# Patient Record
Sex: FEMALE | Race: WHITE | NOT HISPANIC OR LATINO
[De-identification: names, ages, dates, MRNs, and addresses within clinical notes are randomized per-mention and may not be internally consistent; named-entity substitution may affect disease eponyms.]

---

## 2018-09-02 PROBLEM — Z00.00 ENCOUNTER FOR PREVENTIVE HEALTH EXAMINATION: Status: ACTIVE | Noted: 2018-09-02

## 2018-09-10 ENCOUNTER — MEDICATION RENEWAL (OUTPATIENT)
Age: 58
End: 2018-09-10

## 2018-10-02 ENCOUNTER — APPOINTMENT (OUTPATIENT)
Dept: ENDOCRINOLOGY | Facility: CLINIC | Age: 58
End: 2018-10-02
Payer: COMMERCIAL

## 2018-10-02 VITALS — HEART RATE: 88 BPM

## 2018-10-02 VITALS
HEIGHT: 64 IN | WEIGHT: 177 LBS | SYSTOLIC BLOOD PRESSURE: 120 MMHG | HEART RATE: 96 BPM | DIASTOLIC BLOOD PRESSURE: 66 MMHG | BODY MASS INDEX: 30.22 KG/M2

## 2018-10-02 DIAGNOSIS — Z86.19 PERSONAL HISTORY OF OTHER INFECTIOUS AND PARASITIC DISEASES: ICD-10-CM

## 2018-10-02 LAB
GLUCOSE BLDC GLUCOMTR-MCNC: 120
PODIATRY EVAL: 2018

## 2018-10-02 PROCEDURE — 82962 GLUCOSE BLOOD TEST: CPT

## 2018-10-02 PROCEDURE — 99214 OFFICE O/P EST MOD 30 MIN: CPT | Mod: 25

## 2018-10-02 RX ORDER — BLOOD-GLUCOSE METER
W/DEVICE KIT MISCELLANEOUS
Qty: 1 | Refills: 0 | Status: ACTIVE | COMMUNITY

## 2018-10-02 RX ORDER — BLOOD SUGAR DIAGNOSTIC
STRIP MISCELLANEOUS 3 TIMES DAILY
Refills: 0 | Status: ACTIVE | COMMUNITY

## 2018-10-02 RX ORDER — TIOTROPIUM BROMIDE INHALATION SPRAY 3.12 UG/1
2.5 SPRAY, METERED RESPIRATORY (INHALATION) DAILY
Refills: 0 | Status: ACTIVE | COMMUNITY

## 2018-11-15 ENCOUNTER — APPOINTMENT (OUTPATIENT)
Dept: ENDOCRINOLOGY | Facility: CLINIC | Age: 58
End: 2018-11-15

## 2018-11-30 ENCOUNTER — APPOINTMENT (OUTPATIENT)
Dept: ENDOCRINOLOGY | Facility: CLINIC | Age: 58
End: 2018-11-30
Payer: COMMERCIAL

## 2018-11-30 PROCEDURE — 76536 US EXAM OF HEAD AND NECK: CPT

## 2018-12-05 ENCOUNTER — MEDICATION RENEWAL (OUTPATIENT)
Age: 58
End: 2018-12-05

## 2019-01-15 ENCOUNTER — RX RENEWAL (OUTPATIENT)
Age: 59
End: 2019-01-15

## 2019-01-16 ENCOUNTER — RECORD ABSTRACTING (OUTPATIENT)
Age: 59
End: 2019-01-16

## 2019-01-28 ENCOUNTER — APPOINTMENT (OUTPATIENT)
Dept: ENDOCRINOLOGY | Facility: CLINIC | Age: 59
End: 2019-01-28
Payer: COMMERCIAL

## 2019-01-28 VITALS
HEIGHT: 64 IN | DIASTOLIC BLOOD PRESSURE: 80 MMHG | HEART RATE: 90 BPM | SYSTOLIC BLOOD PRESSURE: 120 MMHG | BODY MASS INDEX: 30.39 KG/M2 | WEIGHT: 178 LBS

## 2019-01-28 DIAGNOSIS — S93.401A SPRAIN OF UNSPECIFIED LIGAMENT OF RIGHT ANKLE, INITIAL ENCOUNTER: ICD-10-CM

## 2019-01-28 LAB — GLUCOSE BLDC GLUCOMTR-MCNC: 104

## 2019-01-28 PROCEDURE — 82962 GLUCOSE BLOOD TEST: CPT

## 2019-01-28 PROCEDURE — 99214 OFFICE O/P EST MOD 30 MIN: CPT | Mod: 25

## 2019-04-02 ENCOUNTER — APPOINTMENT (OUTPATIENT)
Dept: ENDOCRINOLOGY | Facility: CLINIC | Age: 59
End: 2019-04-02
Payer: COMMERCIAL

## 2019-04-02 VITALS
HEART RATE: 94 BPM | BODY MASS INDEX: 30.05 KG/M2 | DIASTOLIC BLOOD PRESSURE: 80 MMHG | OXYGEN SATURATION: 90 % | HEIGHT: 64 IN | WEIGHT: 176 LBS | SYSTOLIC BLOOD PRESSURE: 128 MMHG

## 2019-04-02 LAB
GLUCOSE BLDC GLUCOMTR-MCNC: 118
GLUCOSE SERPL-MCNC: 123
HBA1C MFR BLD HPLC: 6.4
HDLC SERPL-MCNC: 54
LDLC SERPL DIRECT ASSAY-MCNC: 96

## 2019-04-02 PROCEDURE — 82962 GLUCOSE BLOOD TEST: CPT

## 2019-04-02 PROCEDURE — 99214 OFFICE O/P EST MOD 30 MIN: CPT | Mod: 25

## 2019-09-11 ENCOUNTER — APPOINTMENT (OUTPATIENT)
Dept: ENDOCRINOLOGY | Facility: CLINIC | Age: 59
End: 2019-09-11
Payer: COMMERCIAL

## 2019-09-11 VITALS
WEIGHT: 167 LBS | OXYGEN SATURATION: 93 % | HEART RATE: 85 BPM | DIASTOLIC BLOOD PRESSURE: 64 MMHG | SYSTOLIC BLOOD PRESSURE: 100 MMHG | HEIGHT: 64 IN | BODY MASS INDEX: 28.51 KG/M2

## 2019-09-11 DIAGNOSIS — G47.30 SLEEP APNEA, UNSPECIFIED: ICD-10-CM

## 2019-09-11 LAB — GLUCOSE BLDC GLUCOMTR-MCNC: 113

## 2019-09-11 PROCEDURE — 99214 OFFICE O/P EST MOD 30 MIN: CPT | Mod: 25

## 2019-09-11 PROCEDURE — 82962 GLUCOSE BLOOD TEST: CPT

## 2019-09-11 RX ORDER — ALENDRONATE SODIUM 70 MG/1
70 TABLET ORAL
Refills: 0 | Status: DISCONTINUED | COMMUNITY
End: 2019-09-11

## 2019-09-11 NOTE — HISTORY OF PRESENT ILLNESS
[FreeTextEntry1] : recent stress after breast cancer sugery - problem with lymphatci system in breast\par severe bone pain after recent prolia shot from Dr Brown - put on steroid and pain meds for 1 week, now off meds\par \par 1. T2DM\par Current DM meds: \par MFN  mg 2 tabs daily - tolerating this\par SMBG: testing 1-2x daily. no meter, -160s\par Diet: not watching diet\par Exercise: will exercise more\par Complications:\par eye exam 2/2018 - no DR\par (-) neuropathy\par Stress test 2018 okay per pt\par (-) nephropathy neg urine microalb/Cr 1/2019\par \par 2.3.Hypothyroid, thyroid nodules\par Current thyroid medication: Levothyroxine 125 mcg daily\par Current symptoms:\par Anterior neck pain: denies\par Dysphagia: denies\par Voice changes: denies\par \par 4. Hyperlipidemia - on Pravastatin 20 mg nightly, stopped taking it b/c she thought it was causing dry skin\par

## 2019-09-11 NOTE — PHYSICAL EXAM
[No Acute Distress] : no acute distress [Alert] : alert [Well Nourished] : well nourished [Well Developed] : well developed [Normal Sclera/Conjunctiva] : normal sclera/conjunctiva [No LAD] : no lymphadenopathy [EOMI] : extra ocular movement intact [No Respiratory Distress] : no respiratory distress [Normal Rate and Effort] : normal respiratory rhythm and effort [Normal Rate] : heart rate was normal  [Normal S1, S2] : normal S1 and S2 [No Edema] : there was no peripheral edema [Normal Bowel Sounds] : normal bowel sounds [Not Tender] : non-tender [Soft] : abdomen soft [Not Distended] : not distended [Cranial Nerves Intact] : cranial nerves 2-12 were intact [Normal Reflexes] : deep tendon reflexes were 2+ and symmetric [No Tremors] : no tremors [Oriented x3] : oriented to person, place, and time [Normal Insight/Judgement] : insight and judgment were intact [Normal Affect] : the affect was normal [Normal Mood] : the mood was normal [Foot Ulcers] : no foot ulcers [Right Foot Was Examined] : right foot ~C was examined [Left Foot Was Examined] : left foot ~C was examined [Normal] : normal [2+] : 2+ in the dorsalis pedis [Vibration Dec.] : normal vibratory sensation at the level of the toes [Position Sense Dec.] : normal position sense at the level of the toes [Diminished Throughout Both Feet] : normal tactile sensation with monofilament testing throughout both feet [de-identified] : Left thyroid nodules

## 2019-09-11 NOTE — REASON FOR VISIT
[Follow-Up: _____] : a [unfilled] follow-up visit [FreeTextEntry1] : T2DM, hypothyroid, thyroid nodule, hyperlipidemia

## 2019-09-11 NOTE — ASSESSMENT
[FreeTextEntry1] : 1. T2DM - A1c worse due to diet\par - increase  mg 3 tabs daily\par - restart diet\par - repeat A1c 3 month\par - eye exam with ophthalmology\par \par 2. stable left thyroid nodule - RTO thyroid sono 11/2019\par \par 3. hypothyroid - clinically and biochemically euthyroid\par - cont LT4 125 mcg daily\par \par 4. hyperlipidemia - LDL slightly worse, adhere with statin

## 2019-09-11 NOTE — REVIEW OF SYSTEMS
[Shortness Of Breath] : shortness of breath [SOB on Exertion] : shortness of breath during exertion [Nocturia] : nocturia [Dry Skin] : dry skin [Fatigue] : no fatigue [Recent Weight Gain (___ Lbs)] : no recent weight gain [Recent Weight Loss (___ Lbs)] : no recent weight loss [Visual Field Defect] : no visual field defect [Blurry Vision] : no blurred vision [Dysphagia] : no dysphagia [Dysphonia] : no dysphonia [Neck Pain] : no neck pain [Chest Pain] : no chest pain [Palpitations] : no palpitations [Nausea] : no nausea [Vomiting] : no vomiting was observed [Constipation] : no constipation [Diarrhea] : no diarrhea [Abdominal Pain] : no abdominal pain [Muscle Cramps] : no muscle cramps [Polyuria] : no polyuria [Myalgia] : no myalgia  [Dizziness] : no dizziness [Pain/Numbness of Digits] : no pain/numbness of digits [Depression] : no depression [Anxiety] : no anxiety [Polydipsia] : no polydipsia [Cold Intolerance] : cold tolerant [FreeTextEntry6] : due COPD, now on oxygen [Heat Intolerance] : heat tolerant [FreeTextEntry8] : 2x nightly, stable

## 2019-09-11 NOTE — DATA REVIEWED
[FreeTextEntry1] : Thyroid sonogram in office 11/30/17 - Left MP nodule 2x2x2 mm\par \par Thyroid sonogram 11/30/18\par Findings: \par Right thyroid lobe  4.6x1.8x2.1   cm heterogeneous gland\par Left Thyroid lobe  4.5x2.3x1.9   cm\par Left mid pole hypoechoic nodule 3x2 mm- not vascular\par Left Lower pole coarse calcification 2 mm\par heterogeneous gland\par Impression: stable Left MP nodule, small coarse calcification. repeat thyroid sonogram 1 year\par \par Labs 1/9/19\par Cr 0.67, GFR 98\par LDL chol 77, Tg 159\par A1c 6.8%\par TSh 1.54, Ft4 1.76\par urine microalb/Cr 4\par \par Labs 4/1/19\par Cr 0.70, GFR 96\par Gluc 123, A1c 6.4%\par LDL 96, HDL 54, Tg 177\par FT4 1.34, TSH 3.420\par \par labs 8/17/19\par Gluc 130, A1c 7%\par Cr 0.58, \par LDl 88, HDL 55, Tg 142\par TSH 2.720, FT4 1.35

## 2019-11-21 ENCOUNTER — APPOINTMENT (OUTPATIENT)
Dept: ENDOCRINOLOGY | Facility: CLINIC | Age: 59
End: 2019-11-21
Payer: COMMERCIAL

## 2019-11-21 PROCEDURE — 76536 US EXAM OF HEAD AND NECK: CPT

## 2020-02-18 LAB
HBA1C MFR BLD HPLC: 6.8
LDLC SERPL DIRECT ASSAY-MCNC: 100

## 2020-02-19 ENCOUNTER — APPOINTMENT (OUTPATIENT)
Dept: ENDOCRINOLOGY | Facility: CLINIC | Age: 60
End: 2020-02-19
Payer: COMMERCIAL

## 2020-02-19 VITALS
HEART RATE: 96 BPM | SYSTOLIC BLOOD PRESSURE: 120 MMHG | HEIGHT: 64 IN | OXYGEN SATURATION: 81 % | WEIGHT: 164 LBS | BODY MASS INDEX: 28 KG/M2 | DIASTOLIC BLOOD PRESSURE: 70 MMHG

## 2020-02-19 LAB — GLUCOSE BLDC GLUCOMTR-MCNC: 140

## 2020-02-19 PROCEDURE — 99214 OFFICE O/P EST MOD 30 MIN: CPT | Mod: 25

## 2020-02-19 PROCEDURE — 82962 GLUCOSE BLOOD TEST: CPT

## 2020-02-19 RX ORDER — DENOSUMAB 60 MG/ML
60 INJECTION SUBCUTANEOUS
Refills: 0 | Status: DISCONTINUED | COMMUNITY
End: 2020-02-19

## 2020-02-19 NOTE — REVIEW OF SYSTEMS
[Shortness Of Breath] : shortness of breath [SOB on Exertion] : shortness of breath during exertion [Nocturia] : nocturia [Dry Skin] : dry skin [Fatigue] : no fatigue [Recent Weight Gain (___ Lbs)] : no recent weight gain [Recent Weight Loss (___ Lbs)] : no recent weight loss [Visual Field Defect] : no visual field defect [Dysphonia] : no dysphonia [Blurry Vision] : no blurred vision [Dysphagia] : no dysphagia [Neck Pain] : no neck pain [Chest Pain] : no chest pain [Palpitations] : no palpitations [Nausea] : no nausea [Vomiting] : no vomiting was observed [Abdominal Pain] : no abdominal pain [Constipation] : no constipation [Diarrhea] : no diarrhea [Muscle Cramps] : no muscle cramps [Polyuria] : no polyuria [Dizziness] : no dizziness [Myalgia] : no myalgia  [Depression] : no depression [Pain/Numbness of Digits] : no pain/numbness of digits [Anxiety] : no anxiety [Polydipsia] : no polydipsia [Heat Intolerance] : heat tolerant [Cold Intolerance] : cold tolerant [FreeTextEntry8] : 2x nightly, stable [FreeTextEntry6] : due COPD, now on oxygen [FreeTextEntry9] : Right breast pain and swelling since RT - going fot therapy

## 2020-02-19 NOTE — HISTORY OF PRESENT ILLNESS
[FreeTextEntry1] : breast cancer s/p RT -but has damage to lungs\par \par 1. T2DM\par Current DM meds: \par MFN  mg 3 tabs daily - tolerating this. She did not tolerate Jardiance. \par SMBG: testing 1-2x daily. no meter, 's\par Diet: decreased appetite lately\par Exercise: will exercise more\par Complications:\par eye exam 2/2018 - no DR\par (-) neuropathy\par Stress test 2018 okay per pt\par (-) nephropathy neg urine microalb/Cr 1/2019\par \par 2.3.Hypothyroid, thyroid nodules\par Current thyroid medication: Levothyroxine 125 mcg daily\par Current symptoms:\par Anterior neck pain: denies\par Dysphagia: denies\par Voice changes: denies\par \par 4. Hyperlipidemia - on Pravastatin 20 mg nightly, stopped taking it b/c she thought it was causing dry skin\par

## 2020-02-19 NOTE — PHYSICAL EXAM
[Alert] : alert [Well Nourished] : well nourished [Well Developed] : well developed [No Acute Distress] : no acute distress [EOMI] : extra ocular movement intact [Normal Sclera/Conjunctiva] : normal sclera/conjunctiva [No Respiratory Distress] : no respiratory distress [Normal Rate and Effort] : normal respiratory rhythm and effort [No LAD] : no lymphadenopathy [Normal S1, S2] : normal S1 and S2 [No Edema] : there was no peripheral edema [Normal Rate] : heart rate was normal  [Normal Bowel Sounds] : normal bowel sounds [Not Tender] : non-tender [Soft] : abdomen soft [Not Distended] : not distended [No Tremors] : no tremors [Cranial Nerves Intact] : cranial nerves 2-12 were intact [Normal Reflexes] : deep tendon reflexes were 2+ and symmetric [Normal Insight/Judgement] : insight and judgment were intact [Oriented x3] : oriented to person, place, and time [Normal Mood] : the mood was normal [Normal Affect] : the affect was normal [Foot Ulcers] : no foot ulcers [Acanthosis Nigricans] : no acanthosis nigricans [de-identified] : nodular thyroid texture

## 2020-02-19 NOTE — DATA REVIEWED
[FreeTextEntry1] : Thyroid sonogram in office 11/30/17 - Left MP nodule 2x2x2 mm\par \par Thyroid sonogram 11/30/18\par Findings: \par Right thyroid lobe  4.6x1.8x2.1   cm heterogeneous gland\par Left Thyroid lobe  4.5x2.3x1.9   cm\par Left mid pole hypoechoic nodule 3x2 mm- not vascular\par Left Lower pole coarse calcification 2 mm\par heterogeneous gland\par Impression: stable Left MP nodule, small coarse calcification. repeat thyroid sonogram 1 year\par \par Thyroid Ultrasound Report 11/21/19 \par Comparison: Report dated 11/30/18. \par Findings: \par Right thyroid lobe  4.3x1.4x1.9   cm - homogeneous thyroid\par no nodules\par Left Thyroid lobe   4.8x1.7x2.0  cm - homogeneous thyroid\par Left mid pole nodule 2x2x2 mm - hypoechoic, stable\par Left lower pole coarse calcification 2 mm - stable\par Isthmus .6  cm\par Impression\par stable Left nodules and calcification\par repeat thyroid sonogram in 1 year \par Labs 1/9/19\par Cr 0.67, GFR 98\par LDL chol 77, Tg 159\par A1c 6.8%\par TSh 1.54, Ft4 1.76\par urine microalb/Cr 4\par \par Labs 4/1/19\par Cr 0.70, GFR 96\par Gluc 123, A1c 6.4%\par LDL 96, HDL 54, Tg 177\par FT4 1.34, TSH 3.420\par \par labs 8/17/19\par Gluc 130, A1c 7%\par Cr 0.58, \par LDl 88, HDL 55, Tg 142\par TSH 2.720, FT4 1.35\par \par Labs 1/13/20\par Gluc 131, A1c 6.8\par Cr 0.60, \par , HDL 64, Tg 181\par TSH 3.210, FT4 1.40

## 2020-02-19 NOTE — REASON FOR VISIT
[Follow-Up: _____] : a [unfilled] follow-up visit [FreeTextEntry1] : stable T2DM, hypothyroid, thyroid nodule, hyperlipidemia

## 2020-02-19 NOTE — ASSESSMENT
[FreeTextEntry1] : 1. T2DM - A1c imprpved\par - decrease  mg 2 tabs daily, risks of MFN discussed bryon in setting of severe COPD. she is hesitant to start new meds and did not tolerate Jardiance in past.  reassess in 3 months\par - repeat A1c 3 month\par - eye exam with ophthalmology\par \par 2. stable left thyroid nodule - RTO thyroid sono 11/2020\par \par 3. hypothyroid - clinically and biochemically euthyroid\par - cont LT4 125 mcg daily\par \par 4. hyperlipidemia - LDL slightly worse, cont statin, dose increased by PCP

## 2020-04-09 ENCOUNTER — RX RENEWAL (OUTPATIENT)
Age: 60
End: 2020-04-09

## 2020-06-22 LAB
HBA1C MFR BLD HPLC: 6.8
LDLC SERPL DIRECT ASSAY-MCNC: 82

## 2020-06-23 ENCOUNTER — APPOINTMENT (OUTPATIENT)
Dept: ENDOCRINOLOGY | Facility: CLINIC | Age: 60
End: 2020-06-23
Payer: COMMERCIAL

## 2020-06-23 VITALS
WEIGHT: 157 LBS | HEIGHT: 64 IN | DIASTOLIC BLOOD PRESSURE: 74 MMHG | SYSTOLIC BLOOD PRESSURE: 120 MMHG | BODY MASS INDEX: 26.8 KG/M2 | HEART RATE: 89 BPM

## 2020-06-23 LAB — GLUCOSE BLDC GLUCOMTR-MCNC: 117

## 2020-06-23 PROCEDURE — 99214 OFFICE O/P EST MOD 30 MIN: CPT | Mod: 25

## 2020-06-23 PROCEDURE — 82962 GLUCOSE BLOOD TEST: CPT

## 2020-06-23 NOTE — REASON FOR VISIT
[Follow - Up] : a follow-up visit [Thyroid nodule/ MNG] : thyroid nodule/ MNG [Hypothyroidism] : hypothyroidism [DM Type 2] : DM Type 2 [Other___] : [unfilled]

## 2020-06-23 NOTE — HISTORY OF PRESENT ILLNESS
[FreeTextEntry1] : Interval Hx: on Prednisone taper for 14 days for COPD.\par \par 1. T2DM - worse w steroids\par Current DM meds: \par MFN  mg 2 tabs daily - tolerating this. She did not tolerate Jardiance. \par SMBG: testing 1-2x daily. no meter, FS post meals 220s, fasting 140-150's\par Diet: eating 3 meals\par Exercise: none\par Complications:\par eye exam 10/2019 - no DR\par (-) neuropathy\par Stress test 2018 okay per pt\par (-) nephropathy neg urine microalb/Cr 6/2020\par ==============================================\par 2.3.Hypothyroid, thyroid nodules\par Current thyroid medication: Levothyroxine 125 mcg daily\par Current symptoms:\par Anterior neck pain: denies\par Dysphagia: denies\par Voice changes: denies\par ==============================================\par 4. Hyperlipidemia - on Pravastatin 40 mg nightly\par

## 2020-06-23 NOTE — PHYSICAL EXAM
[Alert] : alert [Well Nourished] : well nourished [Healthy Appearance] : healthy appearance [No Acute Distress] : no acute distress [Normal Sclera/Conjunctiva] : normal sclera/conjunctiva [EOMI] : extra ocular movement intact [No LAD] : no lymphadenopathy [No Thyroid Nodules] : no palpable thyroid nodules [No Respiratory Distress] : no respiratory distress [No Accessory Muscle Use] : no accessory muscle use [Clear to Auscultation] : lungs were clear to auscultation bilaterally [Normal S1, S2] : normal S1 and S2 [Normal Rate] : heart rate was normal [No Edema] : no peripheral edema [Normal Bowel Sounds] : normal bowel sounds [Not Tender] : non-tender [Soft] : abdomen soft [Normal Gait] : normal gait [No Rash] : no rash [Cranial Nerves Intact] : cranial nerves 2-12 were intact [Oriented x3] : oriented to person, place, and time [Normal Affect] : the affect was normal [Normal Insight/Judgement] : insight and judgment were intact [Normal Mood] : the mood was normal

## 2020-06-23 NOTE — ASSESSMENT
[FreeTextEntry1] : 1. T2DM - A1c okay, hyperglycemia by history\par - cont  mg 2 tabs daily, risks of MFN discussed bryon in setting of severe COPD, should not take higher doses\par - start Tradjenta 5 mg daily, risks/benefits discussed\par - repeat A1c 3 month\par - eye exam with ophthalmology\par \par 2. stable left thyroid nodule - RTO thyroid sono 11/2020\par \par 3. hypothyroid - clinically and biochemically euthyroid\par - cont LT4 125 mcg daily\par \par 4. hyperlipidemia - LDL okay,, cont statin

## 2020-06-23 NOTE — DATA REVIEWED
[FreeTextEntry1] : Thyroid sonogram in office 11/30/17 - Left MP nodule 2x2x2 mm\par \par Thyroid sonogram 11/30/18\par Findings: \par Right thyroid lobe  4.6x1.8x2.1   cm heterogeneous gland\par Left Thyroid lobe  4.5x2.3x1.9   cm\par Left mid pole hypoechoic nodule 3x2 mm- not vascular\par Left Lower pole coarse calcification 2 mm\par heterogeneous gland\par Impression: stable Left MP nodule, small coarse calcification. repeat thyroid sonogram 1 year\par \par Thyroid Ultrasound Report 11/21/19 \par Comparison: Report dated 11/30/18. \par Findings: \par Right thyroid lobe  4.3x1.4x1.9   cm - homogeneous thyroid\par no nodules\par Left Thyroid lobe   4.8x1.7x2.0  cm - homogeneous thyroid\par Left mid pole nodule 2x2x2 mm - hypoechoic, stable\par Left lower pole coarse calcification 2 mm - stable\par Isthmus .6  cm\par Impression\par stable Left nodules and calcification\par repeat thyroid sonogram in 1 year \par ==================================================\par Labs 1/9/19\par Cr 0.67, GFR 98\par LDL chol 77, Tg 159\par A1c 6.8%\par TSh 1.54, Ft4 1.76\par urine microalb/Cr 4\par \par Labs 4/1/19\par Cr 0.70, GFR 96\par Gluc 123, A1c 6.4%\par LDL 96, HDL 54, Tg 177\par FT4 1.34, TSH 3.420\par \par labs 8/17/19\par Gluc 130, A1c 7%\par Cr 0.58, \par LDl 88, HDL 55, Tg 142\par TSH 2.720, FT4 1.35\par \par Labs 1/13/20\par Gluc 131, A1c 6.8\par Cr 0.60, \par , HDL 64, Tg 181\par TSH 3.210, FT4 1.40\par \par Labs 6/20/20\par Gluc 123, A1c 6.8%\par Cr 0.68, GFR 96\par LDL 82, HDL 71, Tg 152\par TSH 3.37, Ft4 1.66\par urine alb/Cr 8

## 2020-06-23 NOTE — REVIEW OF SYSTEMS
[Fatigue] : no fatigue [Recent Weight Loss (___ Lbs)] : recent weight loss: [unfilled] lbs [Blurred Vision] : no blurred vision [As Noted in HPI] : as noted in HPI [Chest Pain] : no chest pain [Palpitations] : no palpitations [Shortness Of Breath] : shortness of breath [SOB on Exertion] : shortness of breath on exertion [Nausea] : no nausea [Abdominal Pain] : no abdominal pain [Vomiting] : no vomiting [Nocturia] : no nocturia [Polyuria] : no polyuria [Pain/Numbness of Digits] : no pain/numbness of digits [Tremors] : no tremors [Depression] : no depression [Anxiety] : no anxiety [Polydipsia] : no polydipsia [FreeTextEntry6] : on oxygen

## 2020-09-17 ENCOUNTER — RX RENEWAL (OUTPATIENT)
Age: 60
End: 2020-09-17

## 2020-10-08 LAB
HBA1C MFR BLD HPLC: 6.9
LDLC SERPL DIRECT ASSAY-MCNC: 82

## 2020-10-09 ENCOUNTER — APPOINTMENT (OUTPATIENT)
Dept: ENDOCRINOLOGY | Facility: CLINIC | Age: 60
End: 2020-10-09
Payer: COMMERCIAL

## 2020-10-09 VITALS
BODY MASS INDEX: 26.46 KG/M2 | WEIGHT: 155 LBS | OXYGEN SATURATION: 98 % | DIASTOLIC BLOOD PRESSURE: 68 MMHG | HEART RATE: 70 BPM | HEIGHT: 64 IN | SYSTOLIC BLOOD PRESSURE: 124 MMHG

## 2020-10-09 LAB — GLUCOSE BLDC GLUCOMTR-MCNC: 126

## 2020-10-09 PROCEDURE — 82962 GLUCOSE BLOOD TEST: CPT

## 2020-10-09 PROCEDURE — 99215 OFFICE O/P EST HI 40 MIN: CPT | Mod: 25

## 2020-10-09 NOTE — REASON FOR VISIT
[Follow - Up] : a follow-up visit [DM Type 2] : DM Type 2 [Adrenal Evaluation/Adrenal Disorder] : adrenal evaluation/adrenal disorder [Hypothyroidism] : hypothyroidism [Osteoporosis] : osteoporosis [Thyroid nodule/ MNG] : thyroid nodule/ MNG [Other___] : [unfilled]

## 2020-10-09 NOTE — ASSESSMENT
[FreeTextEntry1] : 1. T2DM - A1c stable\par - continue  mg 2 tabs daily, risks of MFN discussed bryon in setting of severe COPD, should not take higher doses\par - pt. declined starting Tradjenta\par - repeat A1c 3 month\par - due for eye exam with ophthalmology\par \par 2. Nodular thickening of left adrenal gland: r/o Cushing Syndrome, check hormones, plasma renin activity, aldosterone, metanephrine, and Cortisol suppression with Dexamethasone \par - will obtain MRI results \par \par 3. stable left thyroid nodule - repeat thyroid sonogram now \par \par 4. hypothyroid - clinically and biochemically euthyroid\par - continue LT4 125 mcg daily\par \par 5. hyperlipidemia - controlled, continue statin. \par \par 6. Osteoporosis: restart Fosamax 70 mg 1 tab weekly, repeat DEXA in 1 year\par - adverse reaction to Prolia \par \par Follow up with PMD r/t wheeze in left upper lung \par \par RTO in 3 months

## 2020-10-09 NOTE — REVIEW OF SYSTEMS
[Recent Weight Gain (___ Lbs)] : recent weight gain: [unfilled] lbs [Cold Intolerance] : cold intolerance [Swelling] : swelling [Fatigue] : no fatigue [Decreased Appetite] : appetite not decreased [Visual Field Defect] : no visual field defect [Blurred Vision] : no blurred vision [Dysphagia] : no dysphagia [Neck Pain] : no neck pain [Dysphonia] : no dysphonia [Chest Pain] : no chest pain [Palpitations] : no palpitations [Constipation] : no constipation [Diarrhea] : no diarrhea [Polyuria] : no polyuria [Dysuria] : no dysuria [Joint Pain] : no joint pain [Muscle Weakness] : no muscle weakness [Dry Skin] : no dry skin [Hirsutism] : no hirsutism [Hair Loss] : no hair loss [Headaches] : no headaches [Tremors] : no tremors [Depression] : no depression [Anxiety] : no anxiety [Polydipsia] : no polydipsia [Heat Intolerance] : no heat intolerance [Easy Bruising] : no tendency for easy bruising [de-identified] : right ankle hx of fracture

## 2020-10-09 NOTE — PHYSICAL EXAM
[Alert] : alert [Well Nourished] : well nourished [No Acute Distress] : no acute distress [Well Developed] : well developed [Normal Sclera/Conjunctiva] : normal sclera/conjunctiva [EOMI] : extra ocular movement intact [No LAD] : no lymphadenopathy [Thyroid Not Enlarged] : the thyroid was not enlarged [No Thyroid Nodules] : no palpable thyroid nodules [No Respiratory Distress] : no respiratory distress [Normal Rate and Effort] : normal respiratory rate and effort [Normal S1, S2] : normal S1 and S2 [Normal Rate] : heart rate was normal [Regular Rhythm] : with a regular rhythm [No Edema] : no peripheral edema [Pedal Pulses Normal] : the pedal pulses are present [Normal Bowel Sounds] : normal bowel sounds [Not Tender] : non-tender [Soft] : abdomen soft [No Stigmata of Cushings Syndrome] : no stigmata of Cushings Syndrome [Normal Gait] : normal gait [No Rash] : no rash [Right Foot Was Examined] : right foot ~C was examined [Left Foot Was Examined] : left foot ~C was examined [Normal] : normal [No Tremors] : no tremors [Oriented x3] : oriented to person, place, and time [Normal Insight/Judgement] : insight and judgment were intact [Normal Mood] : the mood was normal [Acanthosis Nigricans] : no acanthosis nigricans [Foot Ulcers] : no foot ulcers [Diminished Throughout Both Feet] : normal tactile sensation with monofilament testing throughout both feet [de-identified] : wheeze in left upper lung lobe

## 2020-10-09 NOTE — HISTORY OF PRESENT ILLNESS
[FreeTextEntry1] : Pt. reports recently had MI (unsure of date of MI) and she was started on Aspirin\par \par Type 2 DM - worse w steroids\par Current DM meds: \par MFN  mg 2 tabs daily - tolerating this. She did not tolerate Jardiance. \par \par SMBG: testing 1-2x daily. no meter, fasting 124-134\par Current \par \par Diet: eating 3 meals\par Exercise: none\par Weight: loss 34 pound within 1 year \par \par Complications:\par eye exam 10/2019 - no DR, next appointment next week \par (-) neuropathy\par Stress test 2018 okay per pt\par (-) nephropathy neg urine microalb/Cr 6/2020\par \par Hypothyroid, thyroid nodules\par Current thyroid medication: Levothyroxine 125 mcg daily\par Current symptoms:\par Anterior neck pain: denies\par Dysphagia: denies\par Voice changes: denies\par \par New Nodular thickening of left adrenal gland: found incidentally on CT scan\par denies high BP, high blood sugars, weight gain, weakness in muscle or bones, no skin changes. No headaches, sweating or fast HR\par Pt. had MRI done last week.

## 2020-10-10 ENCOUNTER — APPOINTMENT (OUTPATIENT)
Dept: ENDOCRINOLOGY | Facility: CLINIC | Age: 60
End: 2020-10-10

## 2020-12-17 ENCOUNTER — RX RENEWAL (OUTPATIENT)
Age: 60
End: 2020-12-17

## 2020-12-31 ENCOUNTER — APPOINTMENT (OUTPATIENT)
Dept: ENDOCRINOLOGY | Facility: CLINIC | Age: 60
End: 2020-12-31
Payer: COMMERCIAL

## 2020-12-31 PROCEDURE — 76536 US EXAM OF HEAD AND NECK: CPT

## 2020-12-31 PROCEDURE — 99072 ADDL SUPL MATRL&STAF TM PHE: CPT

## 2021-01-19 LAB
HBA1C MFR BLD HPLC: 6.8
LDLC SERPL DIRECT ASSAY-MCNC: 89
MICROALBUMIN/CREAT 24H UR-RTO: 71.98

## 2021-01-20 ENCOUNTER — APPOINTMENT (OUTPATIENT)
Dept: ENDOCRINOLOGY | Facility: CLINIC | Age: 61
End: 2021-01-20
Payer: COMMERCIAL

## 2021-01-20 VITALS
BODY MASS INDEX: 26.98 KG/M2 | DIASTOLIC BLOOD PRESSURE: 70 MMHG | OXYGEN SATURATION: 94 % | WEIGHT: 158 LBS | HEIGHT: 64 IN | HEART RATE: 98 BPM | SYSTOLIC BLOOD PRESSURE: 128 MMHG

## 2021-01-20 LAB — GLUCOSE BLDC GLUCOMTR-MCNC: 124

## 2021-01-20 PROCEDURE — 99214 OFFICE O/P EST MOD 30 MIN: CPT | Mod: 25

## 2021-01-20 PROCEDURE — 99072 ADDL SUPL MATRL&STAF TM PHE: CPT

## 2021-01-20 PROCEDURE — 82962 GLUCOSE BLOOD TEST: CPT

## 2021-01-20 RX ORDER — ANASTROZOLE TABLETS 1 MG/1
1 TABLET ORAL DAILY
Refills: 0 | Status: DISCONTINUED | COMMUNITY
End: 2021-01-20

## 2021-01-20 RX ORDER — DEXAMETHASONE 1 MG/1
1 TABLET ORAL
Qty: 1 | Refills: 0 | Status: DISCONTINUED | COMMUNITY
Start: 2020-10-09 | End: 2021-01-20

## 2021-01-20 RX ORDER — PREDNISONE 50 MG/1
TABLET ORAL
Refills: 0 | Status: DISCONTINUED | COMMUNITY
End: 2021-01-20

## 2021-01-20 RX ORDER — FLUTICASONE PROPIONATE 50 UG/1
50 SPRAY, METERED NASAL
Refills: 0 | Status: DISCONTINUED | COMMUNITY
Start: 2020-02-19 | End: 2021-01-20

## 2021-01-20 NOTE — REASON FOR VISIT
[Follow - Up] : a follow-up visit [DM Type 2] : DM Type 2 [Hypothyroidism] : hypothyroidism [Thyroid nodule/ MNG] : thyroid nodule/ MNG [Other___] : [unfilled]

## 2021-01-20 NOTE — DATA REVIEWED
[FreeTextEntry1] : Thyroid sonogram in office 11/30/17 - Left MP nodule 2x2x2 mm\par \par Thyroid sonogram 11/30/18\par Findings: \par Right thyroid lobe  4.6x1.8x2.1   cm heterogeneous gland\par Left Thyroid lobe  4.5x2.3x1.9   cm\par Left mid pole hypoechoic nodule 3x2 mm- not vascular\par Left Lower pole coarse calcification 2 mm\par heterogeneous gland\par Impression: stable Left MP nodule, small coarse calcification. repeat thyroid sonogram 1 year\par \par Thyroid Ultrasound Report 11/21/19 \par Comparison: Report dated 11/30/18. \par Findings: \par Right thyroid lobe  4.3x1.4x1.9   cm - homogeneous thyroid\par no nodules\par Left Thyroid lobe   4.8x1.7x2.0  cm - homogeneous thyroid\par Left mid pole nodule 2x2x2 mm - hypoechoic, stable\par Left lower pole coarse calcification 2 mm - stable\par Isthmus .6  cm\par Impression\par stable Left nodules and calcification\par repeat thyroid sonogram in 1 year \par \par Thyroid Ultrasound Report 12/31/20 \par Comparison: Report dated 11/21/19. \par Findings: \par Right thyroid lobe  4.6x1.7x1.9   cm - homogeneous gland\par no Right thyroid nodules\par Left Thyroid lobe  5.0x1.9x2.0   cm - homogeneous gland\par Left mid pole cyst 2x2 mm - stable\par Left lower pole 3 mm calcification\par Isthmus  0.4 cm\par Impression\par stable left thyrod cyst and small calfication\par repeat sono 1-2 years \par \par Ladyjudie Weeks, DO\par ==================================================\par CT CAP 9/4/20 - nodular thickening left adrenal gland\par MRI abd 9/30/20 normal adrenal glands, no nodules\par Labs 1/9/19\par Cr 0.67, GFR 98\par LDL chol 77, Tg 159\par A1c 6.8%\par TSh 1.54, Ft4 1.76\par urine microalb/Cr 4\par \par Labs 4/1/19\par Cr 0.70, GFR 96\par Gluc 123, A1c 6.4%\par LDL 96, HDL 54, Tg 177\par FT4 1.34, TSH 3.420\par \par labs 8/17/19\par Gluc 130, A1c 7%\par Cr 0.58, \par LDl 88, HDL 55, Tg 142\par TSH 2.720, FT4 1.35\par \par Labs 1/13/20\par Gluc 131, A1c 6.8\par Cr 0.60, \par , HDL 64, Tg 181\par TSH 3.210, FT4 1.40\par \par Labs 6/20/20\par Gluc 123, A1c 6.8%\par Cr 0.68, GFR 96\par LDL 82, HDL 71, Tg 152\par TSH 3.37, Ft4 1.66\par urine alb/Cr 8\par \par labs 12/30/20 \par Gluc 138, A1c 6.8\par Cr 0.71, GFR 93\par LDL 89, HDL 68, Tg 144\par TSH 1.50, Ft4 1.56\par CBC ok\par B12 554\par vit D 33\par urine alb/Cr 6

## 2021-01-20 NOTE — REVIEW OF SYSTEMS
[Recent Weight Loss (___ Lbs)] : recent weight loss: [unfilled] lbs [As Noted in HPI] : as noted in HPI [Shortness Of Breath] : shortness of breath [SOB on Exertion] : shortness of breath on exertion [Fatigue] : no fatigue [Blurred Vision] : no blurred vision [Chest Pain] : no chest pain [Abdominal Pain] : no abdominal pain [Diarrhea] : no diarrhea [Polyuria] : no polyuria [Nocturia] : no nocturia [Pain/Numbness of Digits] : no pain/numbness of digits [Polydipsia] : no polydipsia [FreeTextEntry6] : on oxygen, has COPD

## 2021-01-20 NOTE — ASSESSMENT
[FreeTextEntry1] : 1. T2DM - A1c okay\par - cont  mg 2 tabs daily, risks of MFN discussed bryon in setting of severe COPD, should not take higher doses\par - repeat A1c 3 month\par - eye exam with ophthalmology\par \par 2. stable left thyroid nodule - RTO thyroid sono 2022\par \par 3. hypothyroid - clinically and biochemically euthyroid\par - cont LT4 125 mcg daily, repeat TFTs 1 week before next visit\par \par 4. hyperlipidemia - LDL okay,, cont statin, repeat LDL 1 week before next visit\par \par 5. Left adrenal gland thickening on Ct 9/202 and  MRI done after showed normal adrenal gland, no further testing needed

## 2021-01-20 NOTE — HISTORY OF PRESENT ILLNESS
[FreeTextEntry1] : Interval Hx: no issues, (+) weight loss\par \par 1. T2DM - improved w diet and meds\par Current DM meds: \par MFN  mg 2 tabs daily - tolerating this. She did not tolerate Jardiance. \par SMBG: testing 1-2x daily. no meter, Fs 120-140's\par Diet: eating 3 meals\par Exercise: none\par Complications:\par eye exam 10/2020 - no DR\par (-) neuropathy\par (-) nephropathy neg urine microalb/Cr `12/2020\par (+) CAD - silent MI\par ==============================================\par 2.3.Hypothyroid, thyroid nodules\par Current thyroid medication: Levothyroxine 125 mcg daily on empty stomach\par Current symptoms:\par Anterior neck pain: denies\par Dysphagia: denies\par Voice changes: denies\par ==============================================\par 4. Hyperlipidemia - on Pravastatin 40 mg nightly\par

## 2021-01-28 ENCOUNTER — APPOINTMENT (OUTPATIENT)
Dept: ENDOCRINOLOGY | Facility: CLINIC | Age: 61
End: 2021-01-28

## 2021-05-11 LAB
HBA1C MFR BLD HPLC: 6.8
LDLC SERPL DIRECT ASSAY-MCNC: 74

## 2021-05-12 ENCOUNTER — APPOINTMENT (OUTPATIENT)
Dept: ENDOCRINOLOGY | Facility: CLINIC | Age: 61
End: 2021-05-12
Payer: COMMERCIAL

## 2021-05-12 VITALS
SYSTOLIC BLOOD PRESSURE: 120 MMHG | HEIGHT: 64 IN | BODY MASS INDEX: 28.51 KG/M2 | WEIGHT: 167 LBS | HEART RATE: 94 BPM | DIASTOLIC BLOOD PRESSURE: 70 MMHG

## 2021-05-12 DIAGNOSIS — K80.20 CALCULUS OF GALLBLADDER W/OUT CHOLECYSTITIS W/OUT OBSTRUCTION: ICD-10-CM

## 2021-05-12 DIAGNOSIS — F17.200 NICOTINE DEPENDENCE, UNSPECIFIED, UNCOMPLICATED: ICD-10-CM

## 2021-05-12 LAB — GLUCOSE BLDC GLUCOMTR-MCNC: 180

## 2021-05-12 PROCEDURE — 99406 BEHAV CHNG SMOKING 3-10 MIN: CPT

## 2021-05-12 PROCEDURE — 82962 GLUCOSE BLOOD TEST: CPT

## 2021-05-12 PROCEDURE — 99214 OFFICE O/P EST MOD 30 MIN: CPT | Mod: 25

## 2021-05-12 PROCEDURE — 99072 ADDL SUPL MATRL&STAF TM PHE: CPT

## 2021-05-12 RX ORDER — AZELASTINE HYDROCHLORIDE 137 UG/1
137 SPRAY, METERED NASAL
Refills: 0 | Status: ACTIVE | COMMUNITY

## 2021-05-13 NOTE — REVIEW OF SYSTEMS
[As Noted in HPI] : as noted in HPI [Shortness Of Breath] : shortness of breath [SOB on Exertion] : shortness of breath on exertion [Recent Weight Gain (___ Lbs)] : recent weight gain: [unfilled] lbs [Fatigue] : no fatigue [Blurred Vision] : no blurred vision [Chest Pain] : no chest pain [Nausea] : no nausea [Abdominal Pain] : no abdominal pain [Diarrhea] : no diarrhea [Polyuria] : no polyuria [Nocturia] : no nocturia [Pain/Numbness of Digits] : no pain/numbness of digits [Polydipsia] : no polydipsia [FreeTextEntry6] : on oxygen, has COPD

## 2021-05-13 NOTE — HISTORY OF PRESENT ILLNESS
[FreeTextEntry1] : Interval Hx: dx w gallstones, on prednisone taper for COPD,fs worsening on prednisone and increased hunger w steroids\par \par 1. T2DM - improved w diet and meds\par Current DM meds: \par MFN  mg 2 tabs daily - tolerating this. She did not tolerate Jardiance. \par SMBG: testing 1-2x daily. no meter, -150's without prednisone\par Diet: eating 3 meals\par Exercise: none\par Complications:\par eye exam 10/2020 - no DR\par (-) neuropathy\par (-) nephropathy neg urine microalb/Cr `12/2020\par (+) CAD - silent MI, following w cardio\par ==============================================\par 2.3.Hypothyroid, thyroid nodules\par Current thyroid medication: Levothyroxine 125 mcg daily on empty stomach\par Current symptoms:\par Anterior neck pain: denies\par Dysphagia: denies\par Voice changes: denies\par ==============================================\par 4. Hyperlipidemia - on Pravastatin 40 mg nightly\par

## 2021-05-13 NOTE — DATA REVIEWED
[FreeTextEntry1] : Thyroid sonogram in office 11/30/17 - Left MP nodule 2x2x2 mm\par \par Thyroid sonogram 11/30/18\par Findings: \par Right thyroid lobe  4.6x1.8x2.1   cm heterogeneous gland\par Left Thyroid lobe  4.5x2.3x1.9   cm\par Left mid pole hypoechoic nodule 3x2 mm- not vascular\par Left Lower pole coarse calcification 2 mm\par heterogeneous gland\par Impression: stable Left MP nodule, small coarse calcification. repeat thyroid sonogram 1 year\par \par Thyroid Ultrasound Report 11/21/19 \par Comparison: Report dated 11/30/18. \par Findings: \par Right thyroid lobe  4.3x1.4x1.9   cm - homogeneous thyroid\par no nodules\par Left Thyroid lobe   4.8x1.7x2.0  cm - homogeneous thyroid\par Left mid pole nodule 2x2x2 mm - hypoechoic, stable\par Left lower pole coarse calcification 2 mm - stable\par Isthmus .6  cm\par Impression\par stable Left nodules and calcification\par repeat thyroid sonogram in 1 year \par \par Thyroid Ultrasound Report 12/31/20 \par Comparison: Report dated 11/21/19. \par Findings: \par Right thyroid lobe  4.6x1.7x1.9   cm - homogeneous gland\par no Right thyroid nodules\par Left Thyroid lobe  5.0x1.9x2.0   cm - homogeneous gland\par Left mid pole cyst 2x2 mm - stable\par Left lower pole 3 mm calcification\par Isthmus  0.4 cm\par Impression\par stable left thyrod cyst and small calfication\par repeat sono 1-2 years \par \par Ladyjudie Weeks, DO\par ==================================================\par CT CAP 9/4/20 - nodular thickening left adrenal gland\par MRI abd 9/30/20 normal adrenal glands, no nodules\par Labs 1/9/19\par Cr 0.67, GFR 98\par LDL chol 77, Tg 159\par A1c 6.8%\par TSh 1.54, Ft4 1.76\par urine microalb/Cr 4\par \par Labs 4/1/19\par Cr 0.70, GFR 96\par Gluc 123, A1c 6.4%\par LDL 96, HDL 54, Tg 177\par FT4 1.34, TSH 3.420\par \par labs 8/17/19\par Gluc 130, A1c 7%\par Cr 0.58, \par LDl 88, HDL 55, Tg 142\par TSH 2.720, FT4 1.35\par \par Labs 1/13/20\par Gluc 131, A1c 6.8\par Cr 0.60, \par , HDL 64, Tg 181\par TSH 3.210, FT4 1.40\par \par Labs 6/20/20\par Gluc 123, A1c 6.8%\par Cr 0.68, GFR 96\par LDL 82, HDL 71, Tg 152\par TSH 3.37, Ft4 1.66\par urine alb/Cr 8\par \par labs 12/30/20 \par Gluc 138, A1c 6.8\par Cr 0.71, GFR 93\par LDL 89, HDL 68, Tg 144\par TSH 1.50, Ft4 1.56\par CBC ok\par B12 554\par vit D 33\par urine alb/Cr 6\par \par Labs 5/7/21\par Gluc 127, A1c 6.8\par Cr 0.64, GFR 97\par LDL 74, HDL 81, Tg 96\par TSH 6.80, ft4 1.13

## 2021-05-13 NOTE — ASSESSMENT
[FreeTextEntry1] : 1. T2DM - A1c okay, worsening FS by history\par - increase  mg 3 tabs daily while on steroids, risks of MFN discussed bryon in setting of severe COPD - will avoid max dosing\par - repeat A1c 3 month\par - eye exam with ophthalmology\par \par 2. stable left thyroid nodule - RTO thyroid sono 2022\par \par 3. hypothyroid - TSH elevated, euthyroid on exam\par - increase LT4 137 mcg daily, repeat TFTs 1 week before next visit\par \par 4. hyperlipidemia - LDL okay,, cont statin, repeat LDL 1 week before next visit\par \par 5. Left adrenal gland thickening on Ct 9/202 and  MRI done after showed normal adrenal gland, no further testing needed\par \par 6. smoker - I discussed at length with the patient concerning cessation of tobacco use.  I reviewed with the patient the adverse affects that smoking has on vascular health, morbidity and mortality. We discussed the use of nicotine replacement, psychiatric counseling and medical therapy.  Counseling time was 3 minutes.\par

## 2021-05-18 ENCOUNTER — NON-APPOINTMENT (OUTPATIENT)
Age: 61
End: 2021-05-18

## 2021-05-28 ENCOUNTER — NON-APPOINTMENT (OUTPATIENT)
Age: 61
End: 2021-05-28

## 2021-06-02 ENCOUNTER — RX RENEWAL (OUTPATIENT)
Age: 61
End: 2021-06-02

## 2021-06-16 ENCOUNTER — RX RENEWAL (OUTPATIENT)
Age: 61
End: 2021-06-16

## 2021-07-16 ENCOUNTER — APPOINTMENT (OUTPATIENT)
Dept: FAMILY MEDICINE | Facility: CLINIC | Age: 61
End: 2021-07-16
Payer: COMMERCIAL

## 2021-07-16 VITALS
SYSTOLIC BLOOD PRESSURE: 110 MMHG | HEART RATE: 78 BPM | HEIGHT: 64 IN | TEMPERATURE: 96.6 F | WEIGHT: 160 LBS | BODY MASS INDEX: 27.31 KG/M2 | DIASTOLIC BLOOD PRESSURE: 62 MMHG | OXYGEN SATURATION: 98 %

## 2021-07-16 DIAGNOSIS — F17.200 NICOTINE DEPENDENCE, UNSPECIFIED, UNCOMPLICATED: ICD-10-CM

## 2021-07-16 PROCEDURE — 99072 ADDL SUPL MATRL&STAF TM PHE: CPT

## 2021-07-16 PROCEDURE — 99213 OFFICE O/P EST LOW 20 MIN: CPT

## 2021-07-16 RX ORDER — IBUPROFEN 200 MG
600 CAPSULE ORAL
Refills: 0 | Status: ACTIVE | COMMUNITY

## 2021-07-16 NOTE — PHYSICAL EXAM
[Normal] : normal rate, regular rhythm, normal S1 and S2 and no murmur heard [No Edema] : there was no peripheral edema [de-identified] : deminished breath sounds    slight insiratory wheeze     on oxygen

## 2021-07-16 NOTE — REVIEW OF SYSTEMS
[Shortness Of Breath] : shortness of breath [Negative] : Constitutional [Chest Pain] : no chest pain [FreeTextEntry6] : with exertion   oxygen dependent

## 2021-07-16 NOTE — HISTORY OF PRESENT ILLNESS
[FreeTextEntry1] : general condition stable\par \par oxygen dependent    follows with pulmonary        \par \par presents for  refills

## 2021-07-16 NOTE — PLAN
[FreeTextEntry1] : encouraged to stop smoking\par \par \par follow up with pulmonary    endo  and oncology\par \par

## 2021-08-13 LAB
HBA1C MFR BLD HPLC: 7.2
LDLC SERPL DIRECT ASSAY-MCNC: 79

## 2021-08-14 ENCOUNTER — APPOINTMENT (OUTPATIENT)
Dept: ENDOCRINOLOGY | Facility: CLINIC | Age: 61
End: 2021-08-14
Payer: COMMERCIAL

## 2021-08-14 VITALS
HEIGHT: 64 IN | OXYGEN SATURATION: 91 % | HEART RATE: 94 BPM | WEIGHT: 154 LBS | DIASTOLIC BLOOD PRESSURE: 60 MMHG | SYSTOLIC BLOOD PRESSURE: 120 MMHG | BODY MASS INDEX: 26.29 KG/M2

## 2021-08-14 LAB — GLUCOSE BLDC GLUCOMTR-MCNC: 153

## 2021-08-14 PROCEDURE — 99214 OFFICE O/P EST MOD 30 MIN: CPT | Mod: 25

## 2021-08-14 PROCEDURE — 82962 GLUCOSE BLOOD TEST: CPT

## 2021-08-14 RX ORDER — SUCRALFATE 1 G/1
1 TABLET ORAL
Refills: 0 | Status: DISCONTINUED | COMMUNITY
End: 2021-08-14

## 2021-08-14 RX ORDER — OMEPRAZOLE 40 MG/1
40 CAPSULE, DELAYED RELEASE ORAL
Refills: 0 | Status: DISCONTINUED | COMMUNITY
End: 2021-08-14

## 2021-08-14 NOTE — REVIEW OF SYSTEMS
[As Noted in HPI] : as noted in HPI [Shortness Of Breath] : shortness of breath [SOB on Exertion] : shortness of breath on exertion [Recent Weight Loss (___ Lbs)] : recent weight loss: [unfilled] lbs [Fatigue] : no fatigue [Blurred Vision] : no blurred vision [Chest Pain] : no chest pain [Nausea] : no nausea [Abdominal Pain] : no abdominal pain [Diarrhea] : no diarrhea [Polyuria] : no polyuria [Nocturia] : no nocturia [Pain/Numbness of Digits] : no pain/numbness of digits [Polydipsia] : no polydipsia [FreeTextEntry6] : on oxygen, has COPD

## 2021-08-14 NOTE — HISTORY OF PRESENT ILLNESS
[FreeTextEntry1] : Interval Hx: has been on steroid taper 2x since last visit due to COPD,  worsening COPD and worsening sugars\par \par 1. T2DM - improved w diet and meds. She did not tolerate Jardiance. \par Current DM meds:  MFN  mg 3 tabs daily - tolerating this. \par SMBG: testing 1-2x daily. -200's\par Diet: eating 3 meals, summer fruits\par Exercise: none\par Complications:\par eye exam 10/2020 - no DR\par (-) neuropathy\par (-) nephropathy neg urine microalb/Cr `12/2020\par (+) CAD - silent MI, following w cardio\par ==============================================\par 2.3.Hypothyroid, thyroid nodules\par Current thyroid medication: Levothyroxine 137 mcg daily on empty stomach\par Current symptoms:\par Anterior neck pain: denies\par Dysphagia: denies\par Voice changes: denies\par ==============================================\par 4. Hyperlipidemia - on Pravastatin 40 mg nightly\par

## 2021-08-14 NOTE — ASSESSMENT
[FreeTextEntry1] : 1. T2DM - A1c worsening\par - decrease  mg 2 tabs daily due to worsening COPD, elevated CO2 levels and risks of lactic acidosis, risks of MFN discussed bryon in setting of severe COPD - will avoid max dosing\par - start januvia 100 mg daily, r/b discussed\par - repeat A1c 3 month\par - eye exam with ophthalmology\par \par 2. stable left thyroid nodule - RTO thyroid sono 2022\par \par 3. hypothyroid - TSH normal, euthyroid on exam\par - cont LT4 137 mcg daily, repeat TFTs 1 week before next visit\par \par 4. hyperlipidemia - LDL okay,, cont statin, repeat LDL 1 week before next visit\par \par 5. Left adrenal gland thickening on Ct 9/2020 and  MRI done after showed normal adrenal gland, no further testing needed\par \par

## 2021-08-14 NOTE — DATA REVIEWED
[FreeTextEntry1] : Thyroid sonogram in office 11/30/17 - Left MP nodule 2x2x2 mm\par \par Thyroid sonogram 11/30/18\par Findings: \par Right thyroid lobe  4.6x1.8x2.1   cm heterogeneous gland\par Left Thyroid lobe  4.5x2.3x1.9   cm\par Left mid pole hypoechoic nodule 3x2 mm- not vascular\par Left Lower pole coarse calcification 2 mm\par heterogeneous gland\par Impression: stable Left MP nodule, small coarse calcification. repeat thyroid sonogram 1 year\par \par Thyroid Ultrasound Report 11/21/19 \par Comparison: Report dated 11/30/18. \par Findings: \par Right thyroid lobe  4.3x1.4x1.9   cm - homogeneous thyroid\par no nodules\par Left Thyroid lobe   4.8x1.7x2.0  cm - homogeneous thyroid\par Left mid pole nodule 2x2x2 mm - hypoechoic, stable\par Left lower pole coarse calcification 2 mm - stable\par Isthmus .6  cm\par Impression\par stable Left nodules and calcification\par repeat thyroid sonogram in 1 year \par \par Thyroid Ultrasound Report 12/31/20 \par Comparison: Report dated 11/21/19. \par Findings: \par Right thyroid lobe  4.6x1.7x1.9   cm - homogeneous gland\par no Right thyroid nodules\par Left Thyroid lobe  5.0x1.9x2.0   cm - homogeneous gland\par Left mid pole cyst 2x2 mm - stable\par Left lower pole 3 mm calcification\par Isthmus  0.4 cm\par Impression\par stable left thyrod cyst and small calfication\par repeat sono 1-2 years \par \par Ladyjudie Weeks, DO\par ==================================================\par CT CAP 9/4/20 - nodular thickening left adrenal gland\par MRI abd 9/30/20 normal adrenal glands, no nodules\par Labs 1/9/19\par Cr 0.67, GFR 98\par LDL chol 77, Tg 159\par A1c 6.8%\par TSh 1.54, Ft4 1.76\par urine microalb/Cr 4\par \par Labs 4/1/19\par Cr 0.70, GFR 96\par Gluc 123, A1c 6.4%\par LDL 96, HDL 54, Tg 177\par FT4 1.34, TSH 3.420\par \par labs 8/17/19\par Gluc 130, A1c 7%\par Cr 0.58, \par LDl 88, HDL 55, Tg 142\par TSH 2.720, FT4 1.35\par \par Labs 1/13/20\par Gluc 131, A1c 6.8\par Cr 0.60, \par , HDL 64, Tg 181\par TSH 3.210, FT4 1.40\par \par Labs 6/20/20\par Gluc 123, A1c 6.8%\par Cr 0.68, GFR 96\par LDL 82, HDL 71, Tg 152\par TSH 3.37, Ft4 1.66\par urine alb/Cr 8\par \par labs 12/30/20 \par Gluc 138, A1c 6.8\par Cr 0.71, GFR 93\par LDL 89, HDL 68, Tg 144\par TSH 1.50, Ft4 1.56\par CBC ok\par B12 554\par vit D 33\par urine alb/Cr 6\par \par Labs 5/7/21\par Gluc 127, A1c 6.8\par Cr 0.64, GFR 97\par LDL 74, HDL 81, Tg 96\par TSH 6.80, ft4 1.13\par \par labs 8/9/21\par Gluc 173\par Cr 0.58, \par HDL 77, LDL 79, Tg 124\par TSh 0.911, FT4 1.52\par A1c 7.2

## 2021-08-17 RX ORDER — SITAGLIPTIN 100 MG/1
100 TABLET, FILM COATED ORAL
Qty: 90 | Refills: 1 | Status: DISCONTINUED | COMMUNITY
Start: 2021-08-14 | End: 2021-08-17

## 2021-08-19 ENCOUNTER — RX RENEWAL (OUTPATIENT)
Age: 61
End: 2021-08-19

## 2021-08-20 ENCOUNTER — NON-APPOINTMENT (OUTPATIENT)
Age: 61
End: 2021-08-20

## 2021-09-09 ENCOUNTER — NON-APPOINTMENT (OUTPATIENT)
Age: 61
End: 2021-09-09

## 2021-10-14 ENCOUNTER — APPOINTMENT (OUTPATIENT)
Dept: FAMILY MEDICINE | Facility: CLINIC | Age: 61
End: 2021-10-14
Payer: COMMERCIAL

## 2021-10-14 ENCOUNTER — APPOINTMENT (OUTPATIENT)
Dept: FAMILY MEDICINE | Facility: CLINIC | Age: 61
End: 2021-10-14

## 2021-10-14 VITALS
WEIGHT: 156 LBS | SYSTOLIC BLOOD PRESSURE: 130 MMHG | OXYGEN SATURATION: 91 % | DIASTOLIC BLOOD PRESSURE: 72 MMHG | HEART RATE: 101 BPM | BODY MASS INDEX: 26.63 KG/M2 | TEMPERATURE: 98.2 F | HEIGHT: 64 IN

## 2021-10-14 PROCEDURE — 99213 OFFICE O/P EST LOW 20 MIN: CPT | Mod: 25

## 2021-10-14 PROCEDURE — 36415 COLL VENOUS BLD VENIPUNCTURE: CPT

## 2021-10-14 NOTE — PLAN
[FreeTextEntry1] : bmp  drawn\par \par follow up with endo  and pulmonary\par \par \par healthy  diet

## 2021-10-14 NOTE — PHYSICAL EXAM
[Normal] : no jugular venous distention, supple, no lymphadenopathy and the thyroid was normal and there were no nodules present [Clear to Auscultation] : lungs were clear to auscultation bilaterally [No Edema] : there was no peripheral edema [de-identified] : deminished  breath sounds

## 2021-10-14 NOTE — HISTORY OF PRESENT ILLNESS
[FreeTextEntry1] : feeling well\par \par o2   dependent\par \par recent hospital   discharge  2 weeks ago  hypokalemia\par \par follows with endo  and pulmonary\par \par

## 2021-10-15 LAB
ANION GAP SERPL CALC-SCNC: 12 MMOL/L
BUN SERPL-MCNC: 8 MG/DL
CALCIUM SERPL-MCNC: 10 MG/DL
CHLORIDE SERPL-SCNC: 95 MMOL/L
CO2 SERPL-SCNC: 34 MMOL/L
CREAT SERPL-MCNC: 0.47 MG/DL
GLUCOSE SERPL-MCNC: 139 MG/DL
POTASSIUM SERPL-SCNC: 4.3 MMOL/L
SODIUM SERPL-SCNC: 142 MMOL/L

## 2021-10-26 ENCOUNTER — APPOINTMENT (OUTPATIENT)
Dept: FAMILY MEDICINE | Facility: CLINIC | Age: 61
End: 2021-10-26
Payer: COMMERCIAL

## 2021-10-26 ENCOUNTER — TRANSCRIPTION ENCOUNTER (OUTPATIENT)
Age: 61
End: 2021-10-26

## 2021-10-26 VITALS
RESPIRATION RATE: 12 BRPM | HEIGHT: 64 IN | DIASTOLIC BLOOD PRESSURE: 70 MMHG | BODY MASS INDEX: 26.63 KG/M2 | WEIGHT: 156 LBS | HEART RATE: 98 BPM | TEMPERATURE: 94.8 F | OXYGEN SATURATION: 94 % | SYSTOLIC BLOOD PRESSURE: 120 MMHG

## 2021-10-26 DIAGNOSIS — J02.9 ACUTE PHARYNGITIS, UNSPECIFIED: ICD-10-CM

## 2021-10-26 PROCEDURE — 99213 OFFICE O/P EST LOW 20 MIN: CPT | Mod: 25

## 2021-10-26 PROCEDURE — 87880 STREP A ASSAY W/OPTIC: CPT | Mod: QW

## 2021-10-26 NOTE — HISTORY OF PRESENT ILLNESS
[FreeTextEntry1] : Reports sore throat for 3 days.  Denies cough or runny nose.  H/O Right Breast Ca. with lymphedema.  States right Axilla nodes tender.\par Known allergy to PCN.

## 2021-10-26 NOTE — ASSESSMENT
[FreeTextEntry1] : Acute Pharyngitis\par Rapid streph negative\par Denies fever , nasal congestion or cough\par H/O Right Breast Ca w/ lymadenopathy

## 2021-10-26 NOTE — PHYSICAL EXAM
[Normal Outer Ear/Nose] : the outer ears and nose were normal in appearance [Declined Breast Exam] : declined breast exam  [Declined Rectal Exam] : declined rectal exam [de-identified] : erythema, no exudate [de-identified] : anterior palpable nodes, right node palpable [de-identified] : Dimininshed , O2 dependent

## 2021-11-06 ENCOUNTER — RX RENEWAL (OUTPATIENT)
Age: 61
End: 2021-11-06

## 2021-11-09 LAB — S PYO AG SPEC QL IA: NORMAL

## 2021-11-11 ENCOUNTER — APPOINTMENT (OUTPATIENT)
Dept: ENDOCRINOLOGY | Facility: CLINIC | Age: 61
End: 2021-11-11

## 2021-11-18 ENCOUNTER — NON-APPOINTMENT (OUTPATIENT)
Age: 61
End: 2021-11-18

## 2021-11-24 ENCOUNTER — APPOINTMENT (OUTPATIENT)
Dept: ENDOCRINOLOGY | Facility: CLINIC | Age: 61
End: 2021-11-24
Payer: COMMERCIAL

## 2021-11-24 VITALS
HEART RATE: 100 BPM | HEIGHT: 65 IN | BODY MASS INDEX: 25.33 KG/M2 | DIASTOLIC BLOOD PRESSURE: 68 MMHG | WEIGHT: 152 LBS | SYSTOLIC BLOOD PRESSURE: 128 MMHG

## 2021-11-24 LAB — GLUCOSE BLDC GLUCOMTR-MCNC: 109

## 2021-11-24 PROCEDURE — 82962 GLUCOSE BLOOD TEST: CPT

## 2021-11-24 PROCEDURE — 99214 OFFICE O/P EST MOD 30 MIN: CPT | Mod: 25

## 2021-11-24 RX ORDER — ALOGLIPTIN 25 MG/1
25 TABLET, FILM COATED ORAL
Qty: 90 | Refills: 1 | Status: DISCONTINUED | COMMUNITY
Start: 2021-08-17 | End: 2021-11-24

## 2021-11-24 NOTE — HISTORY OF PRESENT ILLNESS
[FreeTextEntry1] : Interval Hx: currently on prednisone taper, expected to be done in a few weeks\par On supplemental 02- 3.5 L\par \par 1. T2DM - improved w diet and meds. She did not tolerate Jardiance. \par Current DM meds: MFN  mg 3 tabs daily(was supposed to decrease to 2 times a day but didn’t)\par Glipizide 5 mg daily \par SMBG: testing 1-2x daily. FS mid 100's- FS  in office 109\par Diet: eating 3 meals- frozen bagel/[place] toast/cereal with bananas, egg on roll, waffles with butter\par lunch: pastrami or rye or ham sandwhich italian bread\par string beans, sweet potato, chicken\par bowl of ice cream, cookies/tea \par Exercise: none\par Complications:\par eye exam 10/2020 - no DR\par (-) neuropathy\par (-) nephropathy neg urine microalb/Cr /2020\par (+) CAD - silent MI, following w cardio\par ==============================================\par 2.3.Hypothyroid, thyroid nodules\par Due for thyroid sonogram 2/2022\par Current thyroid medication: Levothyroxine 137 mcg daily on empty stomach\par Current symptoms:\par Anterior neck pain: denies\par Dysphagia: denies\par Voice changes: denies\par ==============================================\par 4. Hyperlipidemia - on Pravastatin 40 mg nightly\par \par BP in office 128/68\par Amlodipine 5 mg daily

## 2021-11-24 NOTE — ASSESSMENT
[FreeTextEntry1] : 1. T2DM - A1c worsening\par - decrease  mg 2 tabs daily due to worsening COPD, elevated CO2 levels and risks of lactic acidosis, risks of MFN discussed bryon in setting of severe COPD - will avoid max dosing (this was supposed to be done at last visit\par - Continue Glipizide 5 mg daily, may not need once she is weaned off steroid. Pt will need to continue to check BS and communicate with office if blood sugars trend low off steroid, may d.c Glipizide. \par - repeat A1c 3 month\par - eye exam with ophthalmology\par \par 2. stable left thyroid nodule - RTO thyroid sono 2022\par \par 3. hypothyroid - need updated TFTs, euthyroid on exam\par - cont LT4 137 mcg daily\par \par 4. hyperlipidemia -need updated lipid panel, cont statin\par \par BP stable in office\par \par 5. Left adrenal gland thickening on Ct 9/2020 and MRI done after showed normal adrenal gland, no further testing needed\par \par Fasting labs needed ASAP\par RTO 3 months

## 2021-11-24 NOTE — PHYSICAL EXAM
[Alert] : alert [Well Nourished] : well nourished [No Acute Distress] : no acute distress [Normal Sclera/Conjunctiva] : normal sclera/conjunctiva [Normal Hearing] : hearing was normal [Thyroid Not Enlarged] : the thyroid was not enlarged [Clear to Auscultation] : lungs were clear to auscultation bilaterally [Normal S1, S2] : normal S1 and S2 [Normal Rate] : heart rate was normal [No Edema] : no peripheral edema [Not Tender] : non-tender [Soft] : abdomen soft [Normal Gait] : normal gait [No Rash] : no rash [No Tremors] : no tremors [Oriented x3] : oriented to person, place, and time [de-identified] : on supplemental 02

## 2021-11-24 NOTE — REVIEW OF SYSTEMS
[Recent Weight Loss (___ Lbs)] : recent weight loss: [unfilled] lbs [Shortness Of Breath] : shortness of breath [Polyuria] : polyuria [Nocturia] : nocturia [Polydipsia] : polydipsia [Dysphagia] : no dysphagia [Neck Pain] : no neck pain [Dysphonia] : no dysphonia [Chest Pain] : no chest pain [Constipation] : no constipation [Diarrhea] : no diarrhea

## 2021-12-15 ENCOUNTER — RESULT CHARGE (OUTPATIENT)
Age: 61
End: 2021-12-15

## 2021-12-15 ENCOUNTER — APPOINTMENT (OUTPATIENT)
Dept: FAMILY MEDICINE | Facility: CLINIC | Age: 61
End: 2021-12-15
Payer: COMMERCIAL

## 2021-12-15 VITALS
RESPIRATION RATE: 12 BRPM | WEIGHT: 155.5 LBS | DIASTOLIC BLOOD PRESSURE: 67 MMHG | SYSTOLIC BLOOD PRESSURE: 118 MMHG | BODY MASS INDEX: 25.91 KG/M2 | HEIGHT: 65 IN | HEART RATE: 87 BPM | OXYGEN SATURATION: 93 %

## 2021-12-15 DIAGNOSIS — R30.0 DYSURIA: ICD-10-CM

## 2021-12-15 LAB
BILIRUB UR QL STRIP: NORMAL
CLARITY UR: CLEAR
COLLECTION METHOD: NORMAL
GLUCOSE UR-MCNC: NORMAL
HCG UR QL: 0.2 EU/DL
HGB UR QL STRIP.AUTO: NORMAL
KETONES UR-MCNC: NORMAL
LEUKOCYTE ESTERASE UR QL STRIP: NORMAL
NITRITE UR QL STRIP: NORMAL
PH UR STRIP: 6.5
PROT UR STRIP-MCNC: NORMAL
SP GR UR STRIP: 1.02

## 2021-12-15 PROCEDURE — 36415 COLL VENOUS BLD VENIPUNCTURE: CPT

## 2021-12-15 PROCEDURE — 99214 OFFICE O/P EST MOD 30 MIN: CPT | Mod: 25

## 2021-12-15 PROCEDURE — 81003 URINALYSIS AUTO W/O SCOPE: CPT | Mod: QW

## 2021-12-15 NOTE — PLAN
[FreeTextEntry1] : labs drawn\par \par \par follow up with pulmonary  has apt. this afternoon\par \par encouraged  work up for  sleep apnea and use of  cpap\par \par will be further discussed with pulmonary at visit

## 2021-12-15 NOTE — HISTORY OF PRESENT ILLNESS
[FreeTextEntry1] : recent hospitalization   elevated   co2  low  potassium  low  mg.\par feeling better\par \par c/o  dysuria\par \par o2  dependent\par has apt. with pulmonary  this afternoon\par \par

## 2021-12-15 NOTE — REVIEW OF SYSTEMS
[Dysuria] : dysuria [Negative] : Constitutional [Chest Pain] : no chest pain [Shortness Of Breath] : no shortness of breath [FreeTextEntry6] : at  baseline   o2  dependent

## 2021-12-16 LAB
ALBUMIN SERPL ELPH-MCNC: 4.4 G/DL
ALP BLD-CCNC: 47 U/L
ALT SERPL-CCNC: 21 U/L
ANION GAP SERPL CALC-SCNC: 15 MMOL/L
AST SERPL-CCNC: 16 U/L
BILIRUB SERPL-MCNC: 0.7 MG/DL
BUN SERPL-MCNC: 10 MG/DL
CALCIUM SERPL-MCNC: 9.9 MG/DL
CHLORIDE SERPL-SCNC: 94 MMOL/L
CO2 SERPL-SCNC: 33 MMOL/L
CREAT SERPL-MCNC: 0.62 MG/DL
GLUCOSE SERPL-MCNC: 182 MG/DL
MAGNESIUM SERPL-MCNC: 2 MG/DL
POTASSIUM SERPL-SCNC: 4.2 MMOL/L
PROT SERPL-MCNC: 6.1 G/DL
SODIUM SERPL-SCNC: 141 MMOL/L

## 2021-12-20 LAB — BACTERIA UR CULT: NORMAL

## 2022-01-10 ENCOUNTER — APPOINTMENT (OUTPATIENT)
Dept: FAMILY MEDICINE | Facility: CLINIC | Age: 62
End: 2022-01-10
Payer: COMMERCIAL

## 2022-01-10 VITALS
DIASTOLIC BLOOD PRESSURE: 70 MMHG | HEART RATE: 93 BPM | WEIGHT: 163.13 LBS | OXYGEN SATURATION: 96 % | SYSTOLIC BLOOD PRESSURE: 122 MMHG | TEMPERATURE: 98 F | HEIGHT: 65 IN | BODY MASS INDEX: 27.18 KG/M2

## 2022-01-10 PROCEDURE — 99214 OFFICE O/P EST MOD 30 MIN: CPT

## 2022-01-10 RX ORDER — ALBUTEROL SULFATE 2.5 MG/3ML
(2.5 MG/3ML) SOLUTION RESPIRATORY (INHALATION)
Qty: 300 | Refills: 0 | Status: ACTIVE | COMMUNITY
Start: 2021-12-13

## 2022-01-10 RX ORDER — AMLODIPINE BESYLATE 5 MG/1
5 TABLET ORAL DAILY
Qty: 90 | Refills: 0 | Status: DISCONTINUED | COMMUNITY
Start: 1900-01-01 | End: 2022-01-10

## 2022-01-10 NOTE — HISTORY OF PRESENT ILLNESS
[FreeTextEntry1] : Med renewal [de-identified] : This very pleasant 61-year-old female comes to the office with an oxygen tank.\par COPD O2 dependent\par She was hospitalized about a month ago for COPD and electrolyte imbalance

## 2022-01-10 NOTE — PLAN
[FreeTextEntry1] : This 61-year-old female presents for medication renewal.  COPD–she was hospitalized about 1 month ago at [place] she suffers from oxygen dependent COPD is on O2 24 hours/day\par She follows with pulmonary\par Breast CA–she had a lumpectomy on the right breast several years ago with resultant lymphedema she sees lymphedema clinic and is concerned about swelling in the leg.  Her Norvasc is DC'd and converted to losartan 100 mg daily\par Hypertension–blood pressure 122/70 she is controlled with amlodipine 5 mg daily.  This medication is converted to losartan 100 mg daily\par Diarrhea–her gynecologist recommends and I fit study and this is executed

## 2022-01-11 ENCOUNTER — LABORATORY RESULT (OUTPATIENT)
Age: 62
End: 2022-01-11

## 2022-01-12 ENCOUNTER — NON-APPOINTMENT (OUTPATIENT)
Age: 62
End: 2022-01-12

## 2022-01-21 ENCOUNTER — APPOINTMENT (OUTPATIENT)
Dept: FAMILY MEDICINE | Facility: CLINIC | Age: 62
End: 2022-01-21
Payer: COMMERCIAL

## 2022-01-21 VITALS
BODY MASS INDEX: 28.51 KG/M2 | HEIGHT: 64 IN | WEIGHT: 167 LBS | HEART RATE: 106 BPM | DIASTOLIC BLOOD PRESSURE: 58 MMHG | SYSTOLIC BLOOD PRESSURE: 138 MMHG | TEMPERATURE: 98 F | OXYGEN SATURATION: 96 %

## 2022-01-21 DIAGNOSIS — I89.0 LYMPHEDEMA, NOT ELSEWHERE CLASSIFIED: ICD-10-CM

## 2022-01-21 PROCEDURE — 99214 OFFICE O/P EST MOD 30 MIN: CPT

## 2022-01-21 NOTE — PHYSICAL EXAM
[Normal] : normal rate, regular rhythm, normal S1 and S2 and no murmur heard [de-identified] : on portable oxygen [de-identified] : +  edema RLE

## 2022-01-21 NOTE — REVIEW OF SYSTEMS
[Negative] : Constitutional [Chest Pain] : no chest pain [FreeTextEntry2] : except as  stated in cc [FreeTextEntry6] : O2  deprndent  no acute distress

## 2022-01-21 NOTE — HISTORY OF PRESENT ILLNESS
[FreeTextEntry1] : reports  edema  right lower  extremity\par \par taken off norvasc   edema persist\par \par history of lymphedem\par \par

## 2022-01-21 NOTE — PLAN
[FreeTextEntry1] : venous Doppler\par \par \par follow up with lymphedema clinic\par \par \par \par continue same meds

## 2022-02-08 ENCOUNTER — APPOINTMENT (OUTPATIENT)
Dept: ENDOCRINOLOGY | Facility: CLINIC | Age: 62
End: 2022-02-08

## 2022-02-09 LAB
HBA1C MFR BLD HPLC: 6.8
LDLC SERPL DIRECT ASSAY-MCNC: 85.6

## 2022-02-10 ENCOUNTER — APPOINTMENT (OUTPATIENT)
Dept: ENDOCRINOLOGY | Facility: CLINIC | Age: 62
End: 2022-02-10

## 2022-03-23 ENCOUNTER — APPOINTMENT (OUTPATIENT)
Dept: FAMILY MEDICINE | Facility: CLINIC | Age: 62
End: 2022-03-23
Payer: COMMERCIAL

## 2022-03-23 VITALS
BODY MASS INDEX: 28.17 KG/M2 | WEIGHT: 165 LBS | DIASTOLIC BLOOD PRESSURE: 76 MMHG | OXYGEN SATURATION: 93 % | HEART RATE: 96 BPM | HEIGHT: 64 IN | SYSTOLIC BLOOD PRESSURE: 124 MMHG | TEMPERATURE: 97.4 F

## 2022-03-23 PROCEDURE — 99214 OFFICE O/P EST MOD 30 MIN: CPT

## 2022-03-23 NOTE — PLAN
[FreeTextEntry1] : follow up with pulmonary  has appointment tomorrow\par \par \par follows with  endo.\par \par follows with oncology\par \par healthy  diet\par \par \par stop smoking\par \par states smokes less the 1 cig. per  day\par

## 2022-03-23 NOTE — HISTORY OF PRESENT ILLNESS
[FreeTextEntry1] : presents for b/p  check\par \par states  b/p  elevated at oncology office yesterday  160/88\par \par today  b/p  124/76

## 2022-03-23 NOTE — REVIEW OF SYSTEMS
[Dyspnea on Exertion] : dyspnea on exertion [Negative] : Constitutional [Chest Pain] : no chest pain [FreeTextEntry2] : except as  stated in cc [FreeTextEntry6] : o2   dependent

## 2022-03-30 ENCOUNTER — RX RENEWAL (OUTPATIENT)
Age: 62
End: 2022-03-30

## 2022-03-30 ENCOUNTER — APPOINTMENT (OUTPATIENT)
Dept: ENDOCRINOLOGY | Facility: CLINIC | Age: 62
End: 2022-03-30
Payer: COMMERCIAL

## 2022-03-30 VITALS
WEIGHT: 176 LBS | BODY MASS INDEX: 30.05 KG/M2 | HEART RATE: 90 BPM | HEIGHT: 64 IN | DIASTOLIC BLOOD PRESSURE: 70 MMHG | OXYGEN SATURATION: 94 % | SYSTOLIC BLOOD PRESSURE: 120 MMHG

## 2022-03-30 LAB — GLUCOSE BLDC GLUCOMTR-MCNC: 123

## 2022-03-30 PROCEDURE — 82962 GLUCOSE BLOOD TEST: CPT

## 2022-03-30 PROCEDURE — 99214 OFFICE O/P EST MOD 30 MIN: CPT | Mod: 25

## 2022-03-30 RX ORDER — LEVOFLOXACIN 500 MG/1
500 TABLET, FILM COATED ORAL
Qty: 7 | Refills: 0 | Status: DISCONTINUED | COMMUNITY
Start: 2021-10-02 | End: 2022-03-30

## 2022-03-30 RX ORDER — METFORMIN ER 500 MG 500 MG/1
500 TABLET ORAL DAILY
Qty: 180 | Refills: 1 | Status: DISCONTINUED | COMMUNITY
Start: 2019-01-15 | End: 2022-03-30

## 2022-03-30 RX ORDER — PREDNISONE 10 MG/1
10 TABLET ORAL DAILY
Qty: 30 | Refills: 1 | Status: ACTIVE | COMMUNITY

## 2022-03-30 RX ORDER — ALBUTEROL SULFATE 90 UG/1
108 (90 BASE) AEROSOL, METERED RESPIRATORY (INHALATION) EVERY 4 HOURS
Refills: 0 | Status: DISCONTINUED | COMMUNITY
End: 2022-03-30

## 2022-03-30 RX ORDER — AZITHROMYCIN 250 MG/1
250 TABLET, FILM COATED ORAL
Qty: 1 | Refills: 0 | Status: DISCONTINUED | COMMUNITY
Start: 2021-10-26 | End: 2022-03-30

## 2022-03-30 RX ORDER — GLIPIZIDE 5 MG/1
5 TABLET ORAL DAILY
Qty: 90 | Refills: 1 | Status: DISCONTINUED | COMMUNITY
Start: 2021-09-30 | End: 2022-03-30

## 2022-03-30 NOTE — PHYSICAL EXAM
[Alert] : alert [No Acute Distress] : no acute distress [EOMI] : extra ocular movement intact [No Respiratory Distress] : no respiratory distress [No Accessory Muscle Use] : no accessory muscle use [Clear to Auscultation] : lungs were clear to auscultation bilaterally [Normal S1, S2] : normal S1 and S2 [Normal Rate] : heart rate was normal [Not Tender] : non-tender [Soft] : abdomen soft [Oriented x3] : oriented to person, place, and time [Normal Affect] : the affect was normal [Normal Insight/Judgement] : insight and judgment were intact [Normal Mood] : the mood was normal [de-identified] : decreased breath sounds bilateral

## 2022-03-30 NOTE — HISTORY OF PRESENT ILLNESS
[FreeTextEntry1] : Interval Hx: has been on steroid taper and now on Prednisone 10 mg daily, worsening COPD on supplemental oxygen all the time\par \par 1. T2DM - improved w diet and meds. She did not tolerate Jardiance and did not tolerate Januvia\par Current DM meds:  MFN  mg 2 tabs daily - tolerating this. , Glipizide 5 mg daily\par SMBG: testing 1-2x daily. -200's\par Diet: eating 3 meals + snacks on fruit or peanut butter sandwich or cake\par Exercise: none\par Complications:\par eye exam 2022 - no DR\par (-) neuropathy\par (-) nephropathy neg urine microalb/Cr `12/2020\par (+) CAD - silent MI, following w cardio\par ==============================================\par 2.3.Hypothyroid, thyroid nodules\par Current thyroid medication: Levothyroxine 137 mcg daily on empty stomach\par Current symptoms:\par Anterior neck pain: denies\par Dysphagia: denies\par Voice changes: denies\par ==============================================\par 4. Hyperlipidemia - on Pravastatin 40 mg nightly\par

## 2022-03-30 NOTE — DATA REVIEWED
[FreeTextEntry1] : Thyroid sonogram in office 11/30/17 - Left MP nodule 2x2x2 mm\par \par Thyroid sonogram 11/30/18\par Findings: \par Right thyroid lobe  4.6x1.8x2.1   cm heterogeneous gland\par Left Thyroid lobe  4.5x2.3x1.9   cm\par Left mid pole hypoechoic nodule 3x2 mm- not vascular\par Left Lower pole coarse calcification 2 mm\par heterogeneous gland\par Impression: stable Left MP nodule, small coarse calcification. repeat thyroid sonogram 1 year\par \par Thyroid Ultrasound Report 11/21/19 \par Comparison: Report dated 11/30/18. \par Findings: \par Right thyroid lobe  4.3x1.4x1.9   cm - homogeneous thyroid\par no nodules\par Left Thyroid lobe   4.8x1.7x2.0  cm - homogeneous thyroid\par Left mid pole nodule 2x2x2 mm - hypoechoic, stable\par Left lower pole coarse calcification 2 mm - stable\par Isthmus .6  cm\par Impression\par stable Left nodules and calcification\par repeat thyroid sonogram in 1 year \par \par Thyroid Ultrasound Report 12/31/20 \par Comparison: Report dated 11/21/19. \par Findings: \par Right thyroid lobe  4.6x1.7x1.9   cm - homogeneous gland\par no Right thyroid nodules\par Left Thyroid lobe  5.0x1.9x2.0   cm - homogeneous gland\par Left mid pole cyst 2x2 mm - stable\par Left lower pole 3 mm calcification\par Isthmus  0.4 cm\par Impression\par stable left thyrod cyst and small calfication\par repeat sono 1-2 years \par \par Ladyjudie Weeks, DO\par ==================================================\par CT CAP 9/4/20 - nodular thickening left adrenal gland\par MRI abd 9/30/20 normal adrenal glands, no nodules\par Labs 1/9/19\par Cr 0.67, GFR 98\par LDL chol 77, Tg 159\par A1c 6.8%\par TSh 1.54, Ft4 1.76\par urine microalb/Cr 4\par \par Labs 4/1/19\par Cr 0.70, GFR 96\par Gluc 123, A1c 6.4%\par LDL 96, HDL 54, Tg 177\par FT4 1.34, TSH 3.420\par \par labs 8/17/19\par Gluc 130, A1c 7%\par Cr 0.58, \par LDl 88, HDL 55, Tg 142\par TSH 2.720, FT4 1.35\par \par Labs 1/13/20\par Gluc 131, A1c 6.8\par Cr 0.60, \par , HDL 64, Tg 181\par TSH 3.210, FT4 1.40\par \par Labs 6/20/20\par Gluc 123, A1c 6.8%\par Cr 0.68, GFR 96\par LDL 82, HDL 71, Tg 152\par TSH 3.37, Ft4 1.66\par urine alb/Cr 8\par \par labs 12/30/20 \par Gluc 138, A1c 6.8\par Cr 0.71, GFR 93\par LDL 89, HDL 68, Tg 144\par TSH 1.50, Ft4 1.56\par CBC ok\par B12 554\par vit D 33\par urine alb/Cr 6\par \par Labs 5/7/21\par Gluc 127, A1c 6.8\par Cr 0.64, GFR 97\par LDL 74, HDL 81, Tg 96\par TSH 6.80, ft4 1.13\par \par labs 8/9/21\par Gluc 173\par Cr 0.58, \par HDL 77, LDL 79, Tg 124\par TSh 0.911, FT4 1.52\par A1c 7.2\par \par Labs 12/2021 Cr 0.5, A1c 6.8, LDL 86\par Labs 3/28/22 TSH 0.830, FT4 1.59

## 2022-03-30 NOTE — REVIEW OF SYSTEMS
[Recent Weight Gain (___ Lbs)] : no recent weight gain [Recent Weight Loss (___ Lbs)] : no recent weight loss [Blurred Vision] : no blurred vision [Chest Pain] : no chest pain [Shortness Of Breath] : shortness of breath [SOB on Exertion] : shortness of breath on exertion [Nausea] : no nausea [Abdominal Pain] : no abdominal pain [Diarrhea] : no diarrhea [Polyuria] : no polyuria [Pain/Numbness of Digits] : no pain/numbness of digits [Nocturia] : no nocturia [Polydipsia] : no polydipsia

## 2022-03-31 ENCOUNTER — RX RENEWAL (OUTPATIENT)
Age: 62
End: 2022-03-31

## 2022-04-08 ENCOUNTER — APPOINTMENT (OUTPATIENT)
Dept: FAMILY MEDICINE | Facility: CLINIC | Age: 62
End: 2022-04-08

## 2022-04-15 ENCOUNTER — NON-APPOINTMENT (OUTPATIENT)
Age: 62
End: 2022-04-15

## 2022-04-19 ENCOUNTER — APPOINTMENT (OUTPATIENT)
Dept: ENDOCRINOLOGY | Facility: CLINIC | Age: 62
End: 2022-04-19

## 2022-04-19 ENCOUNTER — NON-APPOINTMENT (OUTPATIENT)
Age: 62
End: 2022-04-19

## 2022-06-13 ENCOUNTER — RX RENEWAL (OUTPATIENT)
Age: 62
End: 2022-06-13

## 2022-07-01 ENCOUNTER — APPOINTMENT (OUTPATIENT)
Dept: FAMILY MEDICINE | Facility: CLINIC | Age: 62
End: 2022-07-01

## 2022-07-01 VITALS
BODY MASS INDEX: 32.1 KG/M2 | OXYGEN SATURATION: 96 % | TEMPERATURE: 96 F | WEIGHT: 188 LBS | RESPIRATION RATE: 15 BRPM | SYSTOLIC BLOOD PRESSURE: 138 MMHG | DIASTOLIC BLOOD PRESSURE: 74 MMHG | HEIGHT: 64 IN | HEART RATE: 92 BPM

## 2022-07-01 DIAGNOSIS — R60.0 LOCALIZED EDEMA: ICD-10-CM

## 2022-07-01 DIAGNOSIS — R53.83 OTHER FATIGUE: ICD-10-CM

## 2022-07-01 PROCEDURE — 99496 TRANSJ CARE MGMT HIGH F2F 7D: CPT | Mod: 25

## 2022-07-01 PROCEDURE — 36415 COLL VENOUS BLD VENIPUNCTURE: CPT

## 2022-07-01 NOTE — HEALTH RISK ASSESSMENT
[No falls in past year] : Patient reported no falls in the past year [0] : 2) Feeling down, depressed, or hopeless: Not at all (0) [[id number]] : 0

## 2022-07-01 NOTE — PHYSICAL EXAM
[Normal] : no acute distress, well nourished, well developed and well-appearing [Decreased Breath Sounds Bilaterally Bases] : breath sounds were diminished over both bases

## 2022-07-01 NOTE — PLAN
[FreeTextEntry1] : BNP for lower extremity edema. Patient given a rx for compression stockings. If labs are normal follow up with vascular.\par \par Continue home 02 @ 3LNC. Continue medications from hospital discharge.\par \par Follow up in 1 week if symptoms continue.

## 2022-07-05 LAB
ALBUMIN SERPL ELPH-MCNC: 4.3 G/DL
ALP BLD-CCNC: 49 U/L
ALT SERPL-CCNC: 33 U/L
ANION GAP SERPL CALC-SCNC: 11 MMOL/L
AST SERPL-CCNC: 23 U/L
BASOPHILS # BLD AUTO: 0.05 K/UL
BASOPHILS NFR BLD AUTO: 0.3 %
BILIRUB SERPL-MCNC: 0.8 MG/DL
BUN SERPL-MCNC: 15 MG/DL
CALCIUM SERPL-MCNC: 9.7 MG/DL
CHLORIDE SERPL-SCNC: 95 MMOL/L
CO2 SERPL-SCNC: 36 MMOL/L
CREAT SERPL-MCNC: 0.59 MG/DL
EGFR: 102 ML/MIN/1.73M2
EOSINOPHIL # BLD AUTO: 0.04 K/UL
EOSINOPHIL NFR BLD AUTO: 0.3 %
GLUCOSE SERPL-MCNC: 194 MG/DL
HCT VFR BLD CALC: 47.1 %
HGB BLD-MCNC: 14.4 G/DL
IMM GRANULOCYTES NFR BLD AUTO: 1.2 %
LYMPHOCYTES # BLD AUTO: 0.82 K/UL
LYMPHOCYTES NFR BLD AUTO: 5.7 %
MAN DIFF?: NORMAL
MCHC RBC-ENTMCNC: 30.3 PG
MCHC RBC-ENTMCNC: 30.6 GM/DL
MCV RBC AUTO: 99.2 FL
MONOCYTES # BLD AUTO: 0.51 K/UL
MONOCYTES NFR BLD AUTO: 3.5 %
NEUTROPHILS # BLD AUTO: 12.8 K/UL
NEUTROPHILS NFR BLD AUTO: 89 %
PLATELET # BLD AUTO: 158 K/UL
POTASSIUM SERPL-SCNC: 4.3 MMOL/L
PROT SERPL-MCNC: 6.6 G/DL
RBC # BLD: 4.75 M/UL
RBC # FLD: 12.7 %
SODIUM SERPL-SCNC: 142 MMOL/L
WBC # FLD AUTO: 14.39 K/UL

## 2022-07-18 ENCOUNTER — APPOINTMENT (OUTPATIENT)
Dept: FAMILY MEDICINE | Facility: CLINIC | Age: 62
End: 2022-07-18

## 2022-07-25 LAB — TSH SERPL-ACNC: 6.09

## 2022-07-26 ENCOUNTER — APPOINTMENT (OUTPATIENT)
Dept: ENDOCRINOLOGY | Facility: CLINIC | Age: 62
End: 2022-07-26

## 2022-07-27 ENCOUNTER — APPOINTMENT (OUTPATIENT)
Dept: ENDOCRINOLOGY | Facility: CLINIC | Age: 62
End: 2022-07-27

## 2022-07-27 PROCEDURE — 99214 OFFICE O/P EST MOD 30 MIN: CPT | Mod: 95

## 2022-07-27 RX ORDER — ROFLUMILAST 250 UG/1
250 TABLET ORAL
Qty: 90 | Refills: 0 | Status: DISCONTINUED | COMMUNITY
Start: 2021-10-21 | End: 2022-07-27

## 2022-07-27 NOTE — REVIEW OF SYSTEMS
[Recent Weight Gain (___ Lbs)] : recent weight gain: [unfilled] lbs [As Noted in HPI] : as noted in HPI [Shortness Of Breath] : shortness of breath [SOB on Exertion] : shortness of breath on exertion [Polyuria] : polyuria [Nocturia] : nocturia [Muscle Cramps] : muscle cramps [Pain/Numbness of Digits] : pain/numbness of digits [Polydipsia] : polydipsia [Chest Pain] : no chest pain [Nausea] : no nausea [Constipation] : no constipation [Abdominal Pain] : no abdominal pain [Diarrhea] : no diarrhea [FreeTextEntry6] : on oxygen

## 2022-07-27 NOTE — HISTORY OF PRESENT ILLNESS
[FreeTextEntry1] : Interval Hx: hospitalized for COPD exacerbation x 1week last month.  on steroids - prednisone 10 mg daily, (+) weight gain\par \par 1. T2DM - improved w diet and meds. She did not tolerate Jardiance and did not tolerate Januvia\par Current DM meds:  Glipizide 5 mg daily, has been off MFN due to worsening COPD, she did not start Trulicity\par \par SMBG: testing 1-2x daily. 's always high with steroids\par Diet: eating 3 meals + snacks on fruit or peanut butter sandwich or cake\par Exercise: none\par Complications:\par eye exam 2022 - no DR\par (-) neuropathy\par (-) nephropathy neg urine microalb/Cr `12/2020\par (+) CAD - silent MI, following w cardio\par ==============================================\par 2.3.Hypothyroid, thyroid nodules\par Current thyroid medication: Levothyroxine 125 mcg daily on empty stomach\par Current symptoms:\par Anterior neck pain: denies\par Dysphagia: denies\par Voice changes: denies\par ==============================================\par 4. Hyperlipidemia - on Pravastatin 40 mg nightly\par

## 2022-07-27 NOTE — ASSESSMENT
[FreeTextEntry1] : 1. T2DM - worsening glucoses, worsening COPD, Intolerant to januvia and jardiace. Off MFN due to severe/worsening COPD\par - cont Glipizide 5 mg daily\par - start Trulciity 0.75 mg weekly, risks/benefits discussed, RTO for teaching\par - cont FS checks\par - labs from [facility], repeat A1c 3 months\par - yearly eye exam with ophthalmology\par \par 2. stable left thyroid nodule - RTO thyroid sono 2022 (later this year)\par \par 3. hypothyroid - TSH elevated\par - increase LT4 137 mcg daily, repeat TFTs 1 week before next visit\par \par 4. hyperlipidemia - cont statin\par \par 5. Left adrenal gland thickening on Ct 9/2020 and  MRI done after showed normal adrenal gland, no further testing needed\par \par

## 2022-07-27 NOTE — REASON FOR VISIT
[Home] : at home, [unfilled] , at the time of the visit. [Medical Office: ([facility])___] : at the medical office located in  [Patient] : the patient [Follow - Up] : a follow-up visit [DM Type 2] : DM Type 2 [Hypothyroidism] : hypothyroidism [Thyroid nodule/ MNG] : thyroid nodule/ MNG [Other___] : [unfilled]

## 2022-07-27 NOTE — DATA REVIEWED
[FreeTextEntry1] : Thyroid sonogram in office 11/30/17 - Left MP nodule 2x2x2 mm\par \par Thyroid sonogram 11/30/18\par Findings: \par Right thyroid lobe  4.6x1.8x2.1   cm heterogeneous gland\par Left Thyroid lobe  4.5x2.3x1.9   cm\par Left mid pole hypoechoic nodule 3x2 mm- not vascular\par Left Lower pole coarse calcification 2 mm\par heterogeneous gland\par Impression: stable Left MP nodule, small coarse calcification. repeat thyroid sonogram 1 year\par \par Thyroid Ultrasound Report 11/21/19 \par Comparison: Report dated 11/30/18. \par Findings: \par Right thyroid lobe  4.3x1.4x1.9   cm - homogeneous thyroid\par no nodules\par Left Thyroid lobe   4.8x1.7x2.0  cm - homogeneous thyroid\par Left mid pole nodule 2x2x2 mm - hypoechoic, stable\par Left lower pole coarse calcification 2 mm - stable\par Isthmus .6  cm\par Impression\par stable Left nodules and calcification\par repeat thyroid sonogram in 1 year \par \par Thyroid Ultrasound Report 12/31/20 \par Comparison: Report dated 11/21/19. \par Findings: \par Right thyroid lobe  4.6x1.7x1.9   cm - homogeneous gland\par no Right thyroid nodules\par Left Thyroid lobe  5.0x1.9x2.0   cm - homogeneous gland\par Left mid pole cyst 2x2 mm - stable\par Left lower pole 3 mm calcification\par Isthmus  0.4 cm\par Impression\par stable left thyrod cyst and small calfication\par repeat sono 1-2 years \par \par Ladyjudie Weeks, DO\par ==================================================\par CT CAP 9/4/20 - nodular thickening left adrenal gland\par MRI abd 9/30/20 normal adrenal glands, no nodules\par Labs 1/9/19\par Cr 0.67, GFR 98\par LDL chol 77, Tg 159\par A1c 6.8%\par TSh 1.54, Ft4 1.76\par urine microalb/Cr 4\par \par Labs 4/1/19\par Cr 0.70, GFR 96\par Gluc 123, A1c 6.4%\par LDL 96, HDL 54, Tg 177\par FT4 1.34, TSH 3.420\par \par labs 8/17/19\par Gluc 130, A1c 7%\par Cr 0.58, \par LDl 88, HDL 55, Tg 142\par TSH 2.720, FT4 1.35\par \par Labs 1/13/20\par Gluc 131, A1c 6.8\par Cr 0.60, \par , HDL 64, Tg 181\par TSH 3.210, FT4 1.40\par \par Labs 6/20/20\par Gluc 123, A1c 6.8%\par Cr 0.68, GFR 96\par LDL 82, HDL 71, Tg 152\par TSH 3.37, Ft4 1.66\par urine alb/Cr 8\par \par labs 12/30/20 \par Gluc 138, A1c 6.8\par Cr 0.71, GFR 93\par LDL 89, HDL 68, Tg 144\par TSH 1.50, Ft4 1.56\par CBC ok\par B12 554\par vit D 33\par urine alb/Cr 6\par \par Labs 5/7/21\par Gluc 127, A1c 6.8\par Cr 0.64, GFR 97\par LDL 74, HDL 81, Tg 96\par TSH 6.80, ft4 1.13\par \par labs 8/9/21\par Gluc 173\par Cr 0.58, \par HDL 77, LDL 79, Tg 124\par TSh 0.911, FT4 1.52\par A1c 7.2\par \par Labs 12/2021 Cr 0.5, A1c 6.8, LDL 86\par Labs 3/28/22 TSH 0.830, FT4 1.59\par Labs 7/7/22 TSH 6.09, FT4 1.24

## 2022-07-29 ENCOUNTER — APPOINTMENT (OUTPATIENT)
Dept: ENDOCRINOLOGY | Facility: CLINIC | Age: 62
End: 2022-07-29

## 2022-07-29 PROCEDURE — 98960 EDU&TRN PT SELF-MGMT NQHP 1: CPT

## 2022-08-02 ENCOUNTER — NON-APPOINTMENT (OUTPATIENT)
Age: 62
End: 2022-08-02

## 2022-08-08 ENCOUNTER — APPOINTMENT (OUTPATIENT)
Dept: FAMILY MEDICINE | Facility: CLINIC | Age: 62
End: 2022-08-08

## 2022-08-08 VITALS
HEIGHT: 64 IN | TEMPERATURE: 97.5 F | SYSTOLIC BLOOD PRESSURE: 120 MMHG | OXYGEN SATURATION: 97 % | WEIGHT: 185 LBS | BODY MASS INDEX: 31.58 KG/M2 | HEART RATE: 108 BPM | DIASTOLIC BLOOD PRESSURE: 50 MMHG | RESPIRATION RATE: 15 BRPM

## 2022-08-08 DIAGNOSIS — R60.0 LOCALIZED EDEMA: ICD-10-CM

## 2022-08-08 PROCEDURE — 99214 OFFICE O/P EST MOD 30 MIN: CPT

## 2022-08-08 NOTE — HISTORY OF PRESENT ILLNESS
[FreeTextEntry1] : reports bilat. ankle swelling\par states no edema when she wakes up but as day progresses  edema starts\par states when she wears her compression stockings she is good\par but does not want to wear them every day\par \par follows with cardiology

## 2022-08-08 NOTE — PLAN
[FreeTextEntry1] : encouraged to wear support stockings  every day\par \par monitor\par \par follow up with cardiology\par \par

## 2022-08-08 NOTE — PHYSICAL EXAM
[Normal] : normal rate, regular rhythm, normal S1 and S2 and no murmur heard [No Carotid Bruits] : no carotid bruits [de-identified] : bilat. ankle edema

## 2022-08-08 NOTE — REVIEW OF SYSTEMS
[Dyspnea on Exertion] : dyspnea on exertion [Negative] : Constitutional [Chest Pain] : no chest pain [FreeTextEntry2] : except as stated on  cc   oxygen dependent [FreeTextEntry6] : oxygen dependent

## 2022-08-19 RX ORDER — GLIPIZIDE 5 MG/1
5 TABLET ORAL
Qty: 90 | Refills: 1 | Status: DISCONTINUED | COMMUNITY
Start: 2022-08-16 | End: 2022-08-19

## 2022-08-26 ENCOUNTER — RX RENEWAL (OUTPATIENT)
Age: 62
End: 2022-08-26

## 2022-09-13 ENCOUNTER — RX RENEWAL (OUTPATIENT)
Age: 62
End: 2022-09-13

## 2022-09-19 ENCOUNTER — NON-APPOINTMENT (OUTPATIENT)
Age: 62
End: 2022-09-19

## 2022-09-22 ENCOUNTER — RX RENEWAL (OUTPATIENT)
Age: 62
End: 2022-09-22

## 2022-09-29 ENCOUNTER — APPOINTMENT (OUTPATIENT)
Dept: FAMILY MEDICINE | Facility: CLINIC | Age: 62
End: 2022-09-29

## 2022-10-04 ENCOUNTER — APPOINTMENT (OUTPATIENT)
Dept: ENDOCRINOLOGY | Facility: CLINIC | Age: 62
End: 2022-10-04

## 2022-10-08 ENCOUNTER — APPOINTMENT (OUTPATIENT)
Dept: ENDOCRINOLOGY | Facility: CLINIC | Age: 62
End: 2022-10-08

## 2022-10-08 VITALS
HEART RATE: 97 BPM | HEIGHT: 64 IN | SYSTOLIC BLOOD PRESSURE: 120 MMHG | BODY MASS INDEX: 31.07 KG/M2 | OXYGEN SATURATION: 97 % | WEIGHT: 182 LBS | DIASTOLIC BLOOD PRESSURE: 70 MMHG

## 2022-10-08 LAB
GLUCOSE BLDC GLUCOMTR-MCNC: 148
HBA1C MFR BLD HPLC: 8.9
LDLC SERPL DIRECT ASSAY-MCNC: 80

## 2022-10-08 PROCEDURE — 99214 OFFICE O/P EST MOD 30 MIN: CPT | Mod: 25

## 2022-10-08 PROCEDURE — 82962 GLUCOSE BLOOD TEST: CPT

## 2022-10-08 RX ORDER — IPRATROPIUM BROMIDE AND ALBUTEROL SULFATE 2.5; .5 MG/3ML; MG/3ML
0.5-2.5 (3) SOLUTION RESPIRATORY (INHALATION)
Qty: 90 | Refills: 0 | Status: ACTIVE | COMMUNITY
Start: 2022-10-03

## 2022-10-08 RX ORDER — NEOMYCIN SULFATE AND POLYMYXIN B SULFATE, BACITRACIN ZINC AND HYDROCORTISONE 3.5; 10000; 400; 1 MG/G; [USP'U]/G; [USP'U]/G; MG/G
1 OINTMENT OPHTHALMIC
Qty: 1 | Refills: 0 | Status: DISCONTINUED | COMMUNITY
Start: 2022-01-10 | End: 2022-10-08

## 2022-10-08 RX ORDER — ROFLUMILAST 500 UG/1
500 TABLET ORAL
Qty: 90 | Refills: 0 | Status: DISCONTINUED | COMMUNITY
Start: 2022-06-02 | End: 2022-10-08

## 2022-10-08 RX ORDER — FAMOTIDINE 20 MG/1
20 TABLET, FILM COATED ORAL
Qty: 10 | Refills: 0 | Status: DISCONTINUED | COMMUNITY
Start: 2022-06-28 | End: 2022-10-08

## 2022-10-08 RX ORDER — GLIPIZIDE 5 MG/1
5 TABLET, FILM COATED, EXTENDED RELEASE ORAL
Qty: 90 | Refills: 1 | Status: DISCONTINUED | COMMUNITY
Start: 2022-03-30 | End: 2022-10-08

## 2022-10-08 RX ORDER — GLIPIZIDE 5 MG/1
5 TABLET ORAL DAILY
Qty: 90 | Refills: 1 | Status: DISCONTINUED | COMMUNITY
End: 2022-10-08

## 2022-10-08 NOTE — PHYSICAL EXAM
[Alert] : alert [No Acute Distress] : no acute distress [EOMI] : extra ocular movement intact [Thyroid Not Enlarged] : the thyroid was not enlarged [No Thyroid Nodules] : no palpable thyroid nodules [No Accessory Muscle Use] : no accessory muscle use [Clear to Auscultation] : lungs were clear to auscultation bilaterally [Normal S1, S2] : normal S1 and S2 [Normal Rate] : heart rate was normal [No Edema] : no peripheral edema [Soft] : abdomen soft [Normal Gait] : normal gait [Oriented x3] : oriented to person, place, and time [Normal Affect] : the affect was normal [Normal Insight/Judgement] : insight and judgment were intact [Normal Mood] : the mood was normal [de-identified] : on oxygen

## 2022-10-08 NOTE — HISTORY OF PRESENT ILLNESS
[FreeTextEntry1] : Interval Hx: in past 1 months - hospitalized 2x for COPD exacerbation and gets high dose steroids and now on Prednisone taper to maintenance of 10 mg daily, also fell and broke Left pinky toe.\par \par 1. T2DM - improved w diet and meds. She did not tolerate Jardiance and did not tolerate Januvia. has been off MFN due to worsening COPD\par Current DM meds:  Trulicity 0.75 mg weekly, Glipizide ER 5 mg daily\par \par SMBG: testing 1x daily. meter reviewed - FS 's, recent hyperglycemia w stress\par Diet: eating 3 meals + snacks on fruit or peanut butter sandwich or cake\par Exercise: none\par Complications:\par eye exam 2022 - no DR\par (-) neuropathy\par (-) nephropathy neg urine microalb/Cr  12/2020\par (+) CAD - silent MI, following w cardio\par ==============================================\par 2.3.Hypothyroid, thyroid nodules\par Current thyroid medication: Levothyroxine 125 mcg daily on empty stomach\par Current symptoms:\par Anterior neck pain: denies\par Dysphagia: denies\par Voice changes: denies\par ==============================================\par 4. Hyperlipidemia - on Pravastatin 40 mg nightly\par

## 2022-10-08 NOTE — ASSESSMENT
[FreeTextEntry1] : 1. T2DM - worsening glucoses due to stress and steroids for COPD, worsening COPD, Intolerant to januvia and jardiace. Off MFN due to severe/worsening COPD\par - suggest Glipizide ER 10 mg daily while on high dose prednisone, when back on Prednisone 10 mg daily  then take Glipizide ER 5 mg daily\par - increase Trulciity 1.5 mg weekly\par - cont FS checks, test more, record numbes\par - repeat A1c 3 months\par - yearly eye exam with ophthalmology\par \par 2. stable left thyroid nodule - RTO thyroid sono 2022 to monitor size and appearance of nodule\par \par 3. hypothyroid - \par - cont LT4 125 mcg daily, add on TSH and Ft4 to recent labs, will advise further based on results\par \par 4. hyperlipidemia - cont statin\par \par 5. Left adrenal gland thickening on Ct 9/2020 and  MRI done after showed normal adrenal gland, no further testing needed\par \par

## 2022-10-08 NOTE — DATA REVIEWED
[FreeTextEntry1] : Thyroid sonogram in office 11/30/17 - Left MP nodule 2x2x2 mm\par \par Thyroid sonogram 11/30/18\par Findings: \par Right thyroid lobe  4.6x1.8x2.1   cm heterogeneous gland\par Left Thyroid lobe  4.5x2.3x1.9   cm\par Left mid pole hypoechoic nodule 3x2 mm- not vascular\par Left Lower pole coarse calcification 2 mm\par heterogeneous gland\par Impression: stable Left MP nodule, small coarse calcification. repeat thyroid sonogram 1 year\par \par Thyroid Ultrasound Report 11/21/19 \par Comparison: Report dated 11/30/18. \par Findings: \par Right thyroid lobe  4.3x1.4x1.9   cm - homogeneous thyroid\par no nodules\par Left Thyroid lobe   4.8x1.7x2.0  cm - homogeneous thyroid\par Left mid pole nodule 2x2x2 mm - hypoechoic, stable\par Left lower pole coarse calcification 2 mm - stable\par Isthmus .6  cm\par Impression\par stable Left nodules and calcification\par repeat thyroid sonogram in 1 year \par \par Thyroid Ultrasound Report 12/31/20 \par Comparison: Report dated 11/21/19. \par Findings: \par Right thyroid lobe  4.6x1.7x1.9   cm - homogeneous gland\par no Right thyroid nodules\par Left Thyroid lobe  5.0x1.9x2.0   cm - homogeneous gland\par Left mid pole cyst 2x2 mm - stable\par Left lower pole 3 mm calcification\par Isthmus  0.4 cm\par Impression\par stable left thyrod cyst and small calfication\par repeat sono 1-2 years \par \par Ladyjudie Weeks, DO\par ==================================================\par CT CAP 9/4/20 - nodular thickening left adrenal gland\par MRI abd 9/30/20 normal adrenal glands, no nodules\par Labs 1/9/19\par Cr 0.67, GFR 98\par LDL chol 77, Tg 159\par A1c 6.8%\par TSh 1.54, Ft4 1.76\par urine microalb/Cr 4\par \par Labs 4/1/19\par Cr 0.70, GFR 96\par Gluc 123, A1c 6.4%\par LDL 96, HDL 54, Tg 177\par FT4 1.34, TSH 3.420\par \par labs 8/17/19\par Gluc 130, A1c 7%\par Cr 0.58, \par LDl 88, HDL 55, Tg 142\par TSH 2.720, FT4 1.35\par \par Labs 1/13/20\par Gluc 131, A1c 6.8\par Cr 0.60, \par , HDL 64, Tg 181\par TSH 3.210, FT4 1.40\par \par Labs 6/20/20\par Gluc 123, A1c 6.8%\par Cr 0.68, GFR 96\par LDL 82, HDL 71, Tg 152\par TSH 3.37, Ft4 1.66\par urine alb/Cr 8\par \par labs 12/30/20 \par Gluc 138, A1c 6.8\par Cr 0.71, GFR 93\par LDL 89, HDL 68, Tg 144\par TSH 1.50, Ft4 1.56\par CBC ok\par B12 554\par vit D 33\par urine alb/Cr 6\par \par Labs 5/7/21\par Gluc 127, A1c 6.8\par Cr 0.64, GFR 97\par LDL 74, HDL 81, Tg 96\par TSH 6.80, ft4 1.13\par \par labs 8/9/21\par Gluc 173\par Cr 0.58, \par HDL 77, LDL 79, Tg 124\par TSh 0.911, FT4 1.52\par A1c 7.2\par \par Labs 12/2021 Cr 0.5, A1c 6.8, LDL 86\par Labs 3/28/22 TSH 0.830, FT4 1.59\par Labs 7/7/22 TSH 6.09, FT4 1.24\par \par Labs 10/5/22\par Gluc 189, A1c 8.9\par LDL 80, , Tg 87\par

## 2022-10-08 NOTE — REVIEW OF SYSTEMS
[Fatigue] : fatigue [Recent Weight Gain (___ Lbs)] : recent weight gain: [unfilled] lbs [Blurred Vision] : no blurred vision [Chest Pain] : no chest pain [Shortness Of Breath] : shortness of breath [SOB on Exertion] : shortness of breath on exertion [Nausea] : no nausea [Abdominal Pain] : no abdominal pain [Polyuria] : no polyuria [Nocturia] : no nocturia [Pain/Numbness of Digits] : no pain/numbness of digits [Polydipsia] : no polydipsia

## 2022-10-12 ENCOUNTER — APPOINTMENT (OUTPATIENT)
Dept: FAMILY MEDICINE | Facility: CLINIC | Age: 62
End: 2022-10-12

## 2022-10-12 VITALS
HEART RATE: 105 BPM | TEMPERATURE: 97.1 F | WEIGHT: 184 LBS | HEIGHT: 66 IN | OXYGEN SATURATION: 96 % | RESPIRATION RATE: 15 BRPM | SYSTOLIC BLOOD PRESSURE: 120 MMHG | BODY MASS INDEX: 29.57 KG/M2 | DIASTOLIC BLOOD PRESSURE: 60 MMHG

## 2022-10-12 DIAGNOSIS — E83.42 HYPOMAGNESEMIA: ICD-10-CM

## 2022-10-12 PROCEDURE — 90686 IIV4 VACC NO PRSV 0.5 ML IM: CPT

## 2022-10-12 PROCEDURE — 99495 TRANSJ CARE MGMT MOD F2F 14D: CPT | Mod: 25

## 2022-10-12 PROCEDURE — 90471 IMMUNIZATION ADMIN: CPT

## 2022-10-12 NOTE — HISTORY OF PRESENT ILLNESS
[Patient Contacted By: ____] : and contacted by [unfilled] [FreeTextEntry2] : low mg\par broken toes \par copd exac \par neck l shoulder \par flu

## 2022-10-12 NOTE — PLAN
[FreeTextEntry1] : 62-year-old female status posthospitalization\par Electrolyte abnormality–low magnesium.  She was supplemented via intravenous in the hospital\par Exacerbation of COPD–positive cigarette user.  Nebulization treatments steroids and oxygen for relief\par Cervical syndrome–chronic neck and left shoulder pain started on oxycodone/Percocet\par Fracture toe–fracture toes for protocol discussed

## 2022-10-12 NOTE — HEALTH RISK ASSESSMENT
[0] : 2) Feeling down, depressed, or hopeless: Not at all (0) [PHQ-2 Negative - No further assessment needed] : PHQ-2 Negative - No further assessment needed [[id number]] : 0

## 2022-10-13 ENCOUNTER — NON-APPOINTMENT (OUTPATIENT)
Age: 62
End: 2022-10-13

## 2022-10-27 ENCOUNTER — APPOINTMENT (OUTPATIENT)
Dept: ENDOCRINOLOGY | Facility: CLINIC | Age: 62
End: 2022-10-27

## 2022-10-27 PROCEDURE — 76536 US EXAM OF HEAD AND NECK: CPT

## 2022-11-17 ENCOUNTER — NON-APPOINTMENT (OUTPATIENT)
Age: 62
End: 2022-11-17

## 2022-11-26 ENCOUNTER — RX RENEWAL (OUTPATIENT)
Age: 62
End: 2022-11-26

## 2022-11-28 ENCOUNTER — RX RENEWAL (OUTPATIENT)
Age: 62
End: 2022-11-28

## 2022-12-07 ENCOUNTER — NON-APPOINTMENT (OUTPATIENT)
Age: 62
End: 2022-12-07

## 2023-01-12 ENCOUNTER — APPOINTMENT (OUTPATIENT)
Dept: FAMILY MEDICINE | Facility: CLINIC | Age: 63
End: 2023-01-12
Payer: COMMERCIAL

## 2023-01-12 ENCOUNTER — NON-APPOINTMENT (OUTPATIENT)
Age: 63
End: 2023-01-12

## 2023-01-12 PROCEDURE — 99214 OFFICE O/P EST MOD 30 MIN: CPT

## 2023-01-12 NOTE — PHYSICAL EXAM
[Normal] : normal rate, regular rhythm, normal S1 and S2 and no murmur heard [No Carotid Bruits] : no carotid bruits [No Edema] : there was no peripheral edema [de-identified] : deminished breath sounds bilat.   chronic

## 2023-01-12 NOTE — HEALTH RISK ASSESSMENT
[Current] : Current [0] : 2) Feeling down, depressed, or hopeless: Not at all (0) [PHQ-2 Negative - No further assessment needed] : PHQ-2 Negative - No further assessment needed [[id number]] : 0

## 2023-01-12 NOTE — REVIEW OF SYSTEMS
[Shortness Of Breath] : shortness of breath [Negative] : Constitutional [Chest Pain] : no chest pain [FreeTextEntry6] : with minimal exertion Fair

## 2023-01-12 NOTE — HISTORY OF PRESENT ILLNESS
[Post-hospitalization from ___ Hospital] : Post-hospitalization from [unfilled] Hospital [Admitted on: ___] : The patient was admitted on [unfilled] [Discharged on ___] : discharged on [unfilled] [FreeTextEntry2] : admitted for exacerbation of COPD  and was + flu   treated with tamiflu\par \par \par presents in no acute distress\par \par portable oxygen on

## 2023-01-28 ENCOUNTER — APPOINTMENT (OUTPATIENT)
Dept: ENDOCRINOLOGY | Facility: CLINIC | Age: 63
End: 2023-01-28
Payer: COMMERCIAL

## 2023-01-28 VITALS
DIASTOLIC BLOOD PRESSURE: 80 MMHG | WEIGHT: 186 LBS | BODY MASS INDEX: 28.19 KG/M2 | SYSTOLIC BLOOD PRESSURE: 122 MMHG | HEART RATE: 96 BPM | HEIGHT: 68 IN

## 2023-01-28 DIAGNOSIS — Z86.39 PERSONAL HISTORY OF OTHER ENDOCRINE, NUTRITIONAL AND METABOLIC DISEASE: ICD-10-CM

## 2023-01-28 LAB — GLUCOSE BLDC GLUCOMTR-MCNC: 159

## 2023-01-28 PROCEDURE — 99214 OFFICE O/P EST MOD 30 MIN: CPT | Mod: 25

## 2023-01-28 PROCEDURE — 82962 GLUCOSE BLOOD TEST: CPT

## 2023-01-28 RX ORDER — OXYCODONE AND ACETAMINOPHEN 5; 325 MG/1; MG/1
5-325 TABLET ORAL
Qty: 120 | Refills: 0 | Status: DISCONTINUED | COMMUNITY
Start: 2022-10-03 | End: 2023-01-28

## 2023-01-28 RX ORDER — NICOTINE 21 MG/24HR
21 PATCH, TRANSDERMAL 24 HOURS TRANSDERMAL
Qty: 28 | Refills: 0 | Status: DISCONTINUED | COMMUNITY
Start: 2021-12-13 | End: 2023-01-28

## 2023-01-28 NOTE — PHYSICAL EXAM
[Alert] : alert [No Acute Distress] : no acute distress [EOMI] : extra ocular movement intact [Thyroid Not Enlarged] : the thyroid was not enlarged [No Thyroid Nodules] : no palpable thyroid nodules [No Accessory Muscle Use] : no accessory muscle use [Clear to Auscultation] : lungs were clear to auscultation bilaterally [Normal S1, S2] : normal S1 and S2 [Normal Rate] : heart rate was normal [No Edema] : no peripheral edema [Soft] : abdomen soft [Normal Gait] : normal gait [Oriented x3] : oriented to person, place, and time [Normal Affect] : the affect was normal [Normal Insight/Judgement] : insight and judgment were intact [Normal Mood] : the mood was normal [de-identified] : on oxygen [de-identified] : cushingoid appearance from chronic steroids

## 2023-01-28 NOTE — DATA REVIEWED
[FreeTextEntry1] : Thyroid sonogram in office 11/30/17 - Left MP nodule 2x2x2 mm\par \par Thyroid sonogram 11/30/18\par Findings: \par Right thyroid lobe  4.6x1.8x2.1   cm heterogeneous gland\par Left Thyroid lobe  4.5x2.3x1.9   cm\par Left mid pole hypoechoic nodule 3x2 mm- not vascular\par Left Lower pole coarse calcification 2 mm\par heterogeneous gland\par Impression: stable Left MP nodule, small coarse calcification. repeat thyroid sonogram 1 year\par \par Thyroid Ultrasound Report 11/21/19 \par Comparison: Report dated 11/30/18. \par Findings: \par Right thyroid lobe  4.3x1.4x1.9   cm - homogeneous thyroid\par no nodules\par Left Thyroid lobe   4.8x1.7x2.0  cm - homogeneous thyroid\par Left mid pole nodule 2x2x2 mm - hypoechoic, stable\par Left lower pole coarse calcification 2 mm - stable\par Isthmus .6  cm\par Impression\par stable Left nodules and calcification\par repeat thyroid sonogram in 1 year \par \par Thyroid Ultrasound Report 12/31/20 \par Comparison: Report dated 11/21/19. \par Findings: \par Right thyroid lobe  4.6x1.7x1.9   cm - homogeneous gland\par no Right thyroid nodules\par Left Thyroid lobe  5.0x1.9x2.0   cm - homogeneous gland\par Left mid pole cyst 2x2 mm - stable\par Left lower pole 3 mm calcification\par Isthmus  0.4 cm\par Impression\par stable left thyrod cyst and small calfication\par repeat sono 1-2 years \par \par Thyroid sonogram 10/27/22 normal appearing thyroid, no nodule seen\par ==================================================\par CT CAP 9/4/20 - nodular thickening left adrenal gland\par MRI abd 9/30/20 normal adrenal glands, no nodules\par Labs 1/9/19\par Cr 0.67, GFR 98\par LDL chol 77, Tg 159\par A1c 6.8%\par TSh 1.54, Ft4 1.76\par urine microalb/Cr 4\par \par Labs 4/1/19\par Cr 0.70, GFR 96\par Gluc 123, A1c 6.4%\par LDL 96, HDL 54, Tg 177\par FT4 1.34, TSH 3.420\par \par labs 8/17/19\par Gluc 130, A1c 7%\par Cr 0.58, \par LDl 88, HDL 55, Tg 142\par TSH 2.720, FT4 1.35\par \par Labs 1/13/20\par Gluc 131, A1c 6.8\par Cr 0.60, \par , HDL 64, Tg 181\par TSH 3.210, FT4 1.40\par \par Labs 6/20/20\par Gluc 123, A1c 6.8%\par Cr 0.68, GFR 96\par LDL 82, HDL 71, Tg 152\par TSH 3.37, Ft4 1.66\par urine alb/Cr 8\par \par labs 12/30/20 \par Gluc 138, A1c 6.8\par Cr 0.71, GFR 93\par LDL 89, HDL 68, Tg 144\par TSH 1.50, Ft4 1.56\par CBC ok\par B12 554\par vit D 33\par urine alb/Cr 6\par \par Labs 5/7/21\par Gluc 127, A1c 6.8\par Cr 0.64, GFR 97\par LDL 74, HDL 81, Tg 96\par TSH 6.80, ft4 1.13\par \par labs 8/9/21\par Gluc 173\par Cr 0.58, \par HDL 77, LDL 79, Tg 124\par TSh 0.911, FT4 1.52\par A1c 7.2\par \par Labs 12/2021 Cr 0.5, A1c 6.8, LDL 86\par Labs 3/28/22 TSH 0.830, FT4 1.59\par Labs 7/7/22 TSH 6.09, FT4 1.24\par \par Labs 10/5/22\par Gluc 189, A1c 8.9\par LDL 80, , Tg 87\par \par \par

## 2023-01-28 NOTE — ASSESSMENT
[FreeTextEntry1] : 1. T2DM - worsening glucoses due to stress and steroids for COPD, worsening COPD, Intolerant to januvia and jardiace. Off MFN due to severe/worsening COPD\par - cont Glipizide ER 10 mg daily\par - increase Trulciity 3 mg weekly\par - call with numbers in 3-4 weeks, if still elevated start basal insulin or increase trulicity to max dose\par - cont FS checks, test more, record numbers\par - repeat A1c this month and in 3 months\par - yearly eye exam with ophthalmology\par \par 2. hypothyroid, no thyroid nodule on recent sonogram\par - cont LT4 125 mcg daily, TFTs this month and 1 week before next visit\par \par 3. hyperlipidemia - cont statin\par \par 4. Left adrenal gland thickening on Ct 9/2020 and  MRI done after showed normal adrenal gland, no further testing needed\par \par

## 2023-01-28 NOTE — REVIEW OF SYSTEMS
[Fatigue] : fatigue [Recent Weight Gain (___ Lbs)] : recent weight gain: [unfilled] lbs [Shortness Of Breath] : shortness of breath [SOB on Exertion] : shortness of breath on exertion [Blurred Vision] : no blurred vision [Chest Pain] : no chest pain [Nausea] : no nausea [Abdominal Pain] : no abdominal pain [Polyuria] : no polyuria [Nocturia] : no nocturia [Pain/Numbness of Digits] : no pain/numbness of digits [Polydipsia] : no polydipsia

## 2023-01-28 NOTE — HISTORY OF PRESENT ILLNESS
[FreeTextEntry1] : Interval Hx: [facility] and takes daily Prednisone and and intermittently high dose steroids. no more lows and she did not stop glipizide.  good candidate for lung transplant - work up in progress. still smoking.\par did not have labs done.\par \par 1. T2DM - improved w diet and meds. She did not tolerate Jardiance and did not tolerate Januvia. has been off MFN due to worsening COPD\par Current DM meds:  Trulicity 1.5 mg weekly, Glipizide ER 5 mg 2 tabs daily\par \par SMBG: testing 1x daily. per pt  this am\par Diet: eating 3 meals + snacks on fruit or peanut butter sandwich or cake\par Exercise: none\par Complications:\par eye exam 2022 - no DR\par (-) neuropathy\par (-) nephropathy neg urine microalb/Cr  12/2020\par (+) CAD - silent MI, following w cardio\par ==============================================\par 2.3.Hypothyroid, thyroid nodules\par Current thyroid medication: Levothyroxine 125 mcg daily on empty stomach\par Current symptoms:\par Anterior neck pain: denies\par Dysphagia: denies\par Voice changes: denies\par ==============================================\par 4. Hyperlipidemia - on Pravastatin 40 mg nightly\par

## 2023-02-08 ENCOUNTER — NON-APPOINTMENT (OUTPATIENT)
Age: 63
End: 2023-02-08

## 2023-02-08 RX ORDER — GLIPIZIDE 10 MG/1
10 TABLET, FILM COATED, EXTENDED RELEASE ORAL
Qty: 90 | Refills: 1 | Status: DISCONTINUED | COMMUNITY
Start: 2022-08-19 | End: 2023-02-08

## 2023-03-07 ENCOUNTER — RX RENEWAL (OUTPATIENT)
Age: 63
End: 2023-03-07

## 2023-03-09 ENCOUNTER — APPOINTMENT (OUTPATIENT)
Dept: FAMILY MEDICINE | Facility: CLINIC | Age: 63
End: 2023-03-09
Payer: COMMERCIAL

## 2023-03-09 VITALS
WEIGHT: 189 LBS | HEIGHT: 66 IN | HEART RATE: 110 BPM | OXYGEN SATURATION: 95 % | DIASTOLIC BLOOD PRESSURE: 85 MMHG | RESPIRATION RATE: 15 BRPM | SYSTOLIC BLOOD PRESSURE: 120 MMHG | TEMPERATURE: 97.1 F | BODY MASS INDEX: 30.37 KG/M2

## 2023-03-09 DIAGNOSIS — E87.6 HYPOKALEMIA: ICD-10-CM

## 2023-03-09 PROCEDURE — 99214 OFFICE O/P EST MOD 30 MIN: CPT

## 2023-03-09 NOTE — REVIEW OF SYSTEMS
[Shortness Of Breath] : shortness of breath [Dyspnea on Exertion] : dyspnea on exertion [Negative] : Constitutional [Chest Pain] : no chest pain [FreeTextEntry6] : feeling better

## 2023-03-09 NOTE — HISTORY OF PRESENT ILLNESS
[Post-hospitalization from ___ Hospital] : Post-hospitalization from [unfilled] Hospital [Admitted on: ___] : The patient was admitted on [unfilled] [Discharged on ___] : discharged on [unfilled] [FreeTextEntry2] : admitted for COPD\par \par treated with steroids  and  Neb.\par will be following with pulmonary  and cardiology\par responded  fairly well\par long term oxygen dependent\par statres will be on list for lung transplant

## 2023-03-09 NOTE — HEALTH RISK ASSESSMENT
[0] : 2) Feeling down, depressed, or hopeless: Not at all (0) [PHQ-2 Negative - No further assessment needed] : PHQ-2 Negative - No further assessment needed [[id number]] : 0

## 2023-03-30 ENCOUNTER — RX RENEWAL (OUTPATIENT)
Age: 63
End: 2023-03-30

## 2023-04-13 ENCOUNTER — NON-APPOINTMENT (OUTPATIENT)
Age: 63
End: 2023-04-13

## 2023-04-21 LAB
HBA1C MFR BLD HPLC: 9.4
LDLC SERPL DIRECT ASSAY-MCNC: 76
MICROALBUMIN/CREAT 24H UR-RTO: 8
TSH SERPL-ACNC: 2.41

## 2023-04-22 ENCOUNTER — APPOINTMENT (OUTPATIENT)
Dept: ENDOCRINOLOGY | Facility: CLINIC | Age: 63
End: 2023-04-22
Payer: COMMERCIAL

## 2023-04-22 VITALS
SYSTOLIC BLOOD PRESSURE: 122 MMHG | BODY MASS INDEX: 27.97 KG/M2 | DIASTOLIC BLOOD PRESSURE: 80 MMHG | HEART RATE: 115 BPM | WEIGHT: 174 LBS | HEIGHT: 66 IN

## 2023-04-22 LAB — GLUCOSE BLDC GLUCOMTR-MCNC: 177

## 2023-04-22 PROCEDURE — 99214 OFFICE O/P EST MOD 30 MIN: CPT | Mod: 25

## 2023-04-22 PROCEDURE — 82962 GLUCOSE BLOOD TEST: CPT

## 2023-04-22 RX ORDER — ASPIRIN 81 MG
81 TABLET, DELAYED RELEASE (ENTERIC COATED) ORAL
Refills: 0 | Status: DISCONTINUED | COMMUNITY
End: 2023-04-22

## 2023-04-22 NOTE — REVIEW OF SYSTEMS
[Fatigue] : fatigue [Recent Weight Gain (___ Lbs)] : recent weight gain: [unfilled] lbs [Shortness Of Breath] : shortness of breath [SOB on Exertion] : shortness of breath on exertion [Pain/Numbness of Digits] : pain/numbness of digits [Blurred Vision] : no blurred vision [Chest Pain] : no chest pain [Nausea] : no nausea [Abdominal Pain] : no abdominal pain [Polyuria] : no polyuria [Nocturia] : no nocturia [Polydipsia] : no polydipsia

## 2023-04-22 NOTE — HISTORY OF PRESENT ILLNESS
[FreeTextEntry1] : Interval Hx: [facility] and takes daily Prednisone and and intermittently high dose steroids. good candidate for lung transplant - work up in progress. still smoking.\par did not have labs done. sugars up and down\par glipizide has been causing hypoglycemia at [facility]\par \par 1. T2DM - improved w diet and meds. She did not tolerate Jardiance and did not tolerate Januvia. has been off MFN due to worsening COPD\par Current DM meds:  Trulicity 3 mg weekly, Glipizide ER 5 mg or 10 mg Er - she has both doses at home\par \par SMBG: testing 1x daily, FS 50 when on 10 mg, improved number on 5 mg glipizide\par Diet: eating 3 meals + snacks on fruit or peanut butter sandwich or cake\par Exercise: none\par Complications:\par eye exam 2022 - no DR\par (-) neuropathy\par (-) nephropathy neg urine microalb/Cr  12/2020\par (+) CAD - silent MI, following w cardio\par ==============================================\par 2.3.Hypothyroid, thyroid nodules\par Current thyroid medication: Levothyroxine 125 mcg daily on empty stomach\par Current symptoms:\par Anterior neck pain: denies\par Dysphagia: denies\par Voice changes: denies\par ==============================================\par 4. Hyperlipidemia - on Pravastatin 40 mg nightly\par

## 2023-04-22 NOTE — PHYSICAL EXAM
[Alert] : alert [No Acute Distress] : no acute distress [EOMI] : extra ocular movement intact [No Accessory Muscle Use] : no accessory muscle use [Normal S1, S2] : normal S1 and S2 [Normal Rate] : heart rate was normal [No Edema] : no peripheral edema [Soft] : abdomen soft [Normal Gait] : normal gait [Oriented x3] : oriented to person, place, and time [Normal Affect] : the affect was normal [Normal Insight/Judgement] : insight and judgment were intact [Normal Mood] : the mood was normal [de-identified] : on oxygen, decreased BS bilateral [de-identified] : cushingoid appearance from chronic steroids

## 2023-04-22 NOTE — DATA REVIEWED
[FreeTextEntry1] : Thyroid sonogram in office 11/30/17 - Left MP nodule 2x2x2 mm\par \par Thyroid sonogram 11/30/18\par Findings: \par Right thyroid lobe  4.6x1.8x2.1   cm heterogeneous gland\par Left Thyroid lobe  4.5x2.3x1.9   cm\par Left mid pole hypoechoic nodule 3x2 mm- not vascular\par Left Lower pole coarse calcification 2 mm\par heterogeneous gland\par Impression: stable Left MP nodule, small coarse calcification. repeat thyroid sonogram 1 year\par \par Thyroid Ultrasound Report 11/21/19 \par Comparison: Report dated 11/30/18. \par Findings: \par Right thyroid lobe  4.3x1.4x1.9   cm - homogeneous thyroid\par no nodules\par Left Thyroid lobe   4.8x1.7x2.0  cm - homogeneous thyroid\par Left mid pole nodule 2x2x2 mm - hypoechoic, stable\par Left lower pole coarse calcification 2 mm - stable\par Isthmus .6  cm\par Impression\par stable Left nodules and calcification\par repeat thyroid sonogram in 1 year \par \par Thyroid Ultrasound Report 12/31/20 \par Comparison: Report dated 11/21/19. \par Findings: \par Right thyroid lobe  4.6x1.7x1.9   cm - homogeneous gland\par no Right thyroid nodules\par Left Thyroid lobe  5.0x1.9x2.0   cm - homogeneous gland\par Left mid pole cyst 2x2 mm - stable\par Left lower pole 3 mm calcification\par Isthmus  0.4 cm\par Impression\par stable left thyrod cyst and small calfication\par repeat sono 1-2 years \par \par Thyroid sonogram 10/27/22 normal appearing thyroid, no nodule seen\par ==================================================\par CT CAP 9/4/20 - nodular thickening left adrenal gland\par MRI abd 9/30/20 normal adrenal glands, no nodules\par Labs 1/9/19\par Cr 0.67, GFR 98\par LDL chol 77, Tg 159\par A1c 6.8%\par TSh 1.54, Ft4 1.76\par urine microalb/Cr 4\par \par Labs 4/1/19\par Cr 0.70, GFR 96\par Gluc 123, A1c 6.4%\par LDL 96, HDL 54, Tg 177\par FT4 1.34, TSH 3.420\par \par labs 8/17/19\par Gluc 130, A1c 7%\par Cr 0.58, \par LDl 88, HDL 55, Tg 142\par TSH 2.720, FT4 1.35\par \par Labs 1/13/20\par Gluc 131, A1c 6.8\par Cr 0.60, \par , HDL 64, Tg 181\par TSH 3.210, FT4 1.40\par \par Labs 6/20/20\par Gluc 123, A1c 6.8%\par Cr 0.68, GFR 96\par LDL 82, HDL 71, Tg 152\par TSH 3.37, Ft4 1.66\par urine alb/Cr 8\par \par labs 12/30/20 \par Gluc 138, A1c 6.8\par Cr 0.71, GFR 93\par LDL 89, HDL 68, Tg 144\par TSH 1.50, Ft4 1.56\par CBC ok\par B12 554\par vit D 33\par urine alb/Cr 6\par \par Labs 5/7/21\par Gluc 127, A1c 6.8\par Cr 0.64, GFR 97\par LDL 74, HDL 81, Tg 96\par TSH 6.80, ft4 1.13\par \par labs 8/9/21\par Gluc 173\par Cr 0.58, \par HDL 77, LDL 79, Tg 124\par TSh 0.911, FT4 1.52\par A1c 7.2\par \par Labs 12/2021 Cr 0.5, A1c 6.8, LDL 86\par Labs 3/28/22 TSH 0.830, FT4 1.59\par Labs 7/7/22 TSH 6.09, FT4 1.24\par \par Labs 10/5/22\par Gluc 189, A1c 8.9\par LDL 80, , Tg 87\par \par Labs 2/26/23\par Gluc 141, A1c 9.4\par Cr 0.62, \par LDL 76, HDL 91, Trig 125\par TSH 2.41, FT4 1.62\par urine alb/cr 8\par \par

## 2023-04-22 NOTE — ASSESSMENT
[FreeTextEntry1] : 1. T2DM - worsening glucoses due to stress and steroids for COPD, worsening COPD, Intolerant to januvia and jardiace. Off MFN due to severe/worsening COPD\par - hypoglycemia w Glipizide 10 mg, suggest decreasing daily dose to ER 5 mg but take ER 10 mg when she is on high dose steroids for COPD exacerbation\par - cont Trulciity 3 mg weekly, A1c today if no improvement will try switching to Ozempic\par - she declined insulin therapy\par - cont FS checks, test more, record numbers\par - yearly eye exam with ophthalmology\par \par 2. hypothyroid, no thyroid nodule on recent sonogram\par - cont LT4 125 mcg daily, TFTs today\par \par 3. hyperlipidemia - cont statin\par \par 4. Left adrenal gland thickening on Ct 9/2020 and  MRI done after showed normal adrenal gland, no further testing needed\par \par 5. Bone health - post menopausal, on chronic prednisone, osteoporosis on DXA in 9/2020, on Alendronate, managed by GYn/PCP\par \par

## 2023-05-10 ENCOUNTER — NON-APPOINTMENT (OUTPATIENT)
Age: 63
End: 2023-05-10

## 2023-05-10 RX ORDER — POTASSIUM CHLORIDE 1500 MG/1
20 TABLET, EXTENDED RELEASE ORAL
Qty: 90 | Refills: 0 | Status: DISCONTINUED | COMMUNITY
Start: 2021-12-13 | End: 2023-05-10

## 2023-05-12 ENCOUNTER — NON-APPOINTMENT (OUTPATIENT)
Age: 63
End: 2023-05-12

## 2023-06-28 ENCOUNTER — NON-APPOINTMENT (OUTPATIENT)
Age: 63
End: 2023-06-28

## 2023-06-28 RX ORDER — SEMAGLUTIDE 0.68 MG/ML
2 INJECTION, SOLUTION SUBCUTANEOUS
Qty: 3 | Refills: 0 | Status: DISCONTINUED | COMMUNITY
Start: 2023-05-10 | End: 2023-06-28

## 2023-07-03 ENCOUNTER — RX RENEWAL (OUTPATIENT)
Age: 63
End: 2023-07-03

## 2023-07-11 ENCOUNTER — APPOINTMENT (OUTPATIENT)
Dept: FAMILY MEDICINE | Facility: CLINIC | Age: 63
End: 2023-07-11

## 2023-07-22 ENCOUNTER — APPOINTMENT (OUTPATIENT)
Dept: ENDOCRINOLOGY | Facility: CLINIC | Age: 63
End: 2023-07-22

## 2023-08-07 ENCOUNTER — RX RENEWAL (OUTPATIENT)
Age: 63
End: 2023-08-07

## 2023-08-10 ENCOUNTER — APPOINTMENT (OUTPATIENT)
Dept: FAMILY MEDICINE | Facility: CLINIC | Age: 63
End: 2023-08-10
Payer: COMMERCIAL

## 2023-08-10 VITALS
RESPIRATION RATE: 16 BRPM | HEART RATE: 111 BPM | SYSTOLIC BLOOD PRESSURE: 110 MMHG | DIASTOLIC BLOOD PRESSURE: 60 MMHG | HEIGHT: 66 IN | OXYGEN SATURATION: 94 % | WEIGHT: 166 LBS | TEMPERATURE: 97.5 F | BODY MASS INDEX: 26.68 KG/M2

## 2023-08-10 DIAGNOSIS — C50.919 MALIGNANT NEOPLASM OF UNSPECIFIED SITE OF UNSPECIFIED FEMALE BREAST: ICD-10-CM

## 2023-08-10 PROCEDURE — 99214 OFFICE O/P EST MOD 30 MIN: CPT

## 2023-08-10 RX ORDER — CHLORHEXIDINE GLUCONATE 4 %
325 (65 FE) LIQUID (ML) TOPICAL
Qty: 100 | Refills: 0 | Status: COMPLETED | COMMUNITY
End: 2023-08-10

## 2023-08-11 RX ORDER — CHOLECALCIFEROL (VITAMIN D3) 50 MCG
25 MCG TABLET ORAL DAILY
Qty: 90 | Refills: 0 | Status: ACTIVE | COMMUNITY
Start: 1900-01-01 | End: 1900-01-01

## 2023-08-24 LAB
HBA1C MFR BLD HPLC: 9.1
LDLC SERPL DIRECT ASSAY-MCNC: 64

## 2023-08-26 ENCOUNTER — APPOINTMENT (OUTPATIENT)
Dept: ENDOCRINOLOGY | Facility: CLINIC | Age: 63
End: 2023-08-26
Payer: COMMERCIAL

## 2023-08-26 VITALS
SYSTOLIC BLOOD PRESSURE: 118 MMHG | BODY MASS INDEX: 26.68 KG/M2 | DIASTOLIC BLOOD PRESSURE: 70 MMHG | HEART RATE: 122 BPM | OXYGEN SATURATION: 91 % | WEIGHT: 166 LBS | HEIGHT: 66 IN

## 2023-08-26 PROCEDURE — 82962 GLUCOSE BLOOD TEST: CPT

## 2023-08-26 PROCEDURE — 99215 OFFICE O/P EST HI 40 MIN: CPT | Mod: 25

## 2023-08-26 RX ORDER — GLIPIZIDE 10 MG/1
10 TABLET, FILM COATED, EXTENDED RELEASE ORAL
Refills: 0 | Status: DISCONTINUED | COMMUNITY
Start: 1900-01-01 | End: 2023-08-26

## 2023-08-26 NOTE — PHYSICAL EXAM
[Alert] : alert [No Acute Distress] : no acute distress [EOMI] : extra ocular movement intact [No Accessory Muscle Use] : no accessory muscle use [Normal Rate] : heart rate was normal [Normal S1, S2] : normal S1 and S2 [No Edema] : no peripheral edema [Soft] : abdomen soft [Normal Gait] : normal gait [Oriented x3] : oriented to person, place, and time [Normal Insight/Judgement] : insight and judgment were intact [Normal Affect] : the affect was normal [Normal Mood] : the mood was normal [de-identified] : on oxygen, decreased BS bilateral [de-identified] : cushingoid appearance from chronic steroids

## 2023-08-26 NOTE — HISTORY OF PRESENT ILLNESS
[FreeTextEntry1] : Interval Hx: since last visit in/out of [facility] for COPD/pneumonia and takes daily Prednisone and and intermittently high [facility]   1. T2DM - improved w diet and meds. She did not tolerate Jardiance and did not tolerate Januvia. has been off MFN due to worsening COPD. Did not tolerate Ozempic. Current DM meds:  Trulicity 3 mg weekly, Glipizide ER 10 mg daily  SMBG: testing 1x daily, fasting 60-90's, afternoon 300's Diet: eating 3 meals + snacks on fruit or peanut butter sandwich or cake Exercise: none Complications: eye exam 2022 - no DR (-) neuropathy (-) nephropathy neg urine microalb/Cr 2023 (+) CAD - silent MI, following w cardio ============================================== 2.3.Hypothyroid, thyroid nodules Current thyroid medication: Levothyroxine 125 mcg daily on empty stomach Current symptoms: Anterior neck pain: denies Dysphagia: denies Voice changes: denies ============================================== 4. Hyperlipidemia - on Pravastatin 40 mg nightly

## 2023-08-26 NOTE — REVIEW OF SYSTEMS
[Fatigue] : fatigue [Recent Weight Gain (___ Lbs)] : recent weight gain: [unfilled] lbs [Shortness Of Breath] : shortness of breath [SOB on Exertion] : shortness of breath on exertion [Pain/Numbness of Digits] : pain/numbness of digits [Cough] : cough [Blurred Vision] : no blurred vision [Chest Pain] : no chest pain [Nausea] : no nausea [Abdominal Pain] : no abdominal pain [Polyuria] : no polyuria [Nocturia] : no nocturia [Polydipsia] : no polydipsia

## 2023-08-26 NOTE — DATA REVIEWED
[FreeTextEntry1] : Thyroid sonogram in office 11/30/17 - Left MP nodule 2x2x2 mm  Thyroid sonogram 11/30/18 Findings:  Right thyroid lobe  4.6x1.8x2.1   cm heterogeneous gland Left Thyroid lobe  4.5x2.3x1.9   cm Left mid pole hypoechoic nodule 3x2 mm- not vascular Left Lower pole coarse calcification 2 mm heterogeneous gland Impression: stable Left MP nodule, small coarse calcification. repeat thyroid sonogram 1 year  Thyroid Ultrasound Report 11/21/19  Comparison: Report dated 11/30/18.  Findings:  Right thyroid lobe  4.3x1.4x1.9   cm - homogeneous thyroid no nodules Left Thyroid lobe   4.8x1.7x2.0  cm - homogeneous thyroid Left mid pole nodule 2x2x2 mm - hypoechoic, stable Left lower pole coarse calcification 2 mm - stable Isthmus .6  cm Impression stable Left nodules and calcification repeat thyroid sonogram in 1 year   Thyroid Ultrasound Report 12/31/20  Comparison: Report dated 11/21/19.  Findings:  Right thyroid lobe  4.6x1.7x1.9   cm - homogeneous gland no Right thyroid nodules Left Thyroid lobe  5.0x1.9x2.0   cm - homogeneous gland Left mid pole cyst 2x2 mm - stable Left lower pole 3 mm calcification Isthmus  0.4 cm Impression stable left thyrod cyst and small calfication repeat sono 1-2 years   Thyroid sonogram 10/27/22 normal appearing thyroid, no nodule seen ================================================== CT CAP 9/4/20 - nodular thickening left adrenal gland MRI abd 9/30/20 normal adrenal glands, no nodules Labs 1/9/19 Cr 0.67, GFR 98 LDL chol 77, Tg 159 A1c 6.8% TSh 1.54, Ft4 1.76 urine microalb/Cr 4  Labs 4/1/19 Cr 0.70, GFR 96 Gluc 123, A1c 6.4% LDL 96, HDL 54, Tg 177 FT4 1.34, TSH 3.420  labs 8/17/19 Gluc 130, A1c 7% Cr 0.58,  LDl 88, HDL 55, Tg 142 TSH 2.720, FT4 1.35  Labs 1/13/20 Gluc 131, A1c 6.8 Cr 0.60,  , HDL 64, Tg 181 TSH 3.210, FT4 1.40  Labs 6/20/20 Gluc 123, A1c 6.8% Cr 0.68, GFR 96 LDL 82, HDL 71, Tg 152 TSH 3.37, Ft4 1.66 urine alb/Cr 8  labs 12/30/20  Gluc 138, A1c 6.8 Cr 0.71, GFR 93 LDL 89, HDL 68, Tg 144 TSH 1.50, Ft4 1.56 CBC ok B12 554 vit D 33 urine alb/Cr 6  Labs 5/7/21 Gluc 127, A1c 6.8 Cr 0.64, GFR 97 LDL 74, HDL 81, Tg 96 TSH 6.80, ft4 1.13  labs 8/9/21 Gluc 173 Cr 0.58,  HDL 77, LDL 79, Tg 124 TSh 0.911, FT4 1.52 A1c 7.2  Labs 12/2021 Cr 0.5, A1c 6.8, LDL 86 Labs 3/28/22 TSH 0.830, FT4 1.59 Labs 7/7/22 TSH 6.09, FT4 1.24  Labs 10/5/22 Gluc 189, A1c 8.9 LDL 80, , Tg 87  Labs 2/26/23 Gluc 141, A1c 9.4 Cr 0.62,  LDL 76, HDL 91, Trig 125 TSH 2.41, FT4 1.62 urine alb/cr 8  Labs 5/2023  LDL 64, A1c 9.1

## 2023-08-30 ENCOUNTER — APPOINTMENT (OUTPATIENT)
Dept: ENDOCRINOLOGY | Facility: CLINIC | Age: 63
End: 2023-08-30
Payer: COMMERCIAL

## 2023-08-30 PROCEDURE — 98960 EDU&TRN PT SELF-MGMT NQHP 1: CPT

## 2023-08-30 RX ORDER — INSULIN LISPRO 100 [IU]/ML
100 INJECTION, SOLUTION INTRAVENOUS; SUBCUTANEOUS
Qty: 2 | Refills: 1 | Status: COMPLETED | COMMUNITY
Start: 2023-08-26 | End: 2023-08-30

## 2023-08-31 ENCOUNTER — RX RENEWAL (OUTPATIENT)
Age: 63
End: 2023-08-31

## 2023-09-07 NOTE — PLAN
[FreeTextEntry1] : 63-year-old female presents for evaluation and medication reconciliation Osteoporosis–history of osteopenia she is prescribed alendronate 70 mg once weekly COPD–history of tobacco use just completed a course of prednisone Breast CAD–history of breast CA with radiation treatment in persistent chemotherapy Metabolic syndrome–5 foot 6 and 266 pounds Pravastatin 40 mg daily for lipids Losartan 100 mg daily for hypertension/110/60

## 2023-09-07 NOTE — HISTORY OF PRESENT ILLNESS
[Post-hospitalization from ___ Hospital] : Post-hospitalization from [unfilled] Hospital [Discharged on ___] : discharged on [unfilled] [Discharge Summary] : discharge summary [Discharge Med List] : discharge medication list [Patient Contacted By: ____] : and contacted by [unfilled] [FreeTextEntry2] : prednison  copd   breast ca   rad  pill

## 2023-11-02 ENCOUNTER — APPOINTMENT (OUTPATIENT)
Dept: ENDOCRINOLOGY | Facility: CLINIC | Age: 63
End: 2023-11-02

## 2023-11-07 LAB — NT-PROBNP SERPL-MCNC: 74 PG/ML

## 2023-12-12 LAB
HBA1C MFR BLD HPLC: 8.5
LDLC SERPL DIRECT ASSAY-MCNC: 77
TSH SERPL-ACNC: 2.85

## 2023-12-13 ENCOUNTER — APPOINTMENT (OUTPATIENT)
Dept: ENDOCRINOLOGY | Facility: CLINIC | Age: 63
End: 2023-12-13
Payer: COMMERCIAL

## 2023-12-13 VITALS
HEIGHT: 66 IN | DIASTOLIC BLOOD PRESSURE: 84 MMHG | WEIGHT: 158 LBS | SYSTOLIC BLOOD PRESSURE: 134 MMHG | BODY MASS INDEX: 25.39 KG/M2 | HEART RATE: 122 BPM

## 2023-12-13 LAB — GLUCOSE BLDC GLUCOMTR-MCNC: 210

## 2023-12-13 PROCEDURE — 82962 GLUCOSE BLOOD TEST: CPT

## 2023-12-13 PROCEDURE — 99215 OFFICE O/P EST HI 40 MIN: CPT | Mod: 25
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
## 2023-12-13 NOTE — DATA REVIEWED
[FreeTextEntry1] : Thyroid sonogram in office 11/30/17 - Left MP nodule 2x2x2 mm  Thyroid sonogram 11/30/18 Findings:  Right thyroid lobe  4.6x1.8x2.1   cm heterogeneous gland Left Thyroid lobe  4.5x2.3x1.9   cm Left mid pole hypoechoic nodule 3x2 mm- not vascular Left Lower pole coarse calcification 2 mm heterogeneous gland Impression: stable Left MP nodule, small coarse calcification. repeat thyroid sonogram 1 year  Thyroid Ultrasound Report 11/21/19  Comparison: Report dated 11/30/18.  Findings:  Right thyroid lobe  4.3x1.4x1.9   cm - homogeneous thyroid no nodules Left Thyroid lobe   4.8x1.7x2.0  cm - homogeneous thyroid Left mid pole nodule 2x2x2 mm - hypoechoic, stable Left lower pole coarse calcification 2 mm - stable Isthmus .6  cm Impression stable Left nodules and calcification repeat thyroid sonogram in 1 year   Thyroid Ultrasound Report 12/31/20  Comparison: Report dated 11/21/19.  Findings:  Right thyroid lobe  4.6x1.7x1.9   cm - homogeneous gland no Right thyroid nodules Left Thyroid lobe  5.0x1.9x2.0   cm - homogeneous gland Left mid pole cyst 2x2 mm - stable Left lower pole 3 mm calcification Isthmus  0.4 cm Impression stable left thyrod cyst and small calfication repeat sono 1-2 years   Thyroid sonogram 10/27/22 normal appearing thyroid, no nodule seen ================================================== DEXA 11/3/23 Osteoporosis statistically significant worsening in hip AP spine Tscore -3.4 Left FN -2.1 Left hip -1.4 Right FN -2.3 Right hip -1.9 ================================================== CT CAP 9/4/20 - nodular thickening left adrenal gland MRI abd 9/30/20 normal adrenal glands, no nodules Labs 1/9/19 Cr 0.67, GFR 98 LDL chol 77, Tg 159 A1c 6.8% TSh 1.54, Ft4 1.76 urine microalb/Cr 4  Labs 4/1/19 Cr 0.70, GFR 96 Gluc 123, A1c 6.4% LDL 96, HDL 54, Tg 177 FT4 1.34, TSH 3.420  labs 8/17/19 Gluc 130, A1c 7% Cr 0.58,  LDl 88, HDL 55, Tg 142 TSH 2.720, FT4 1.35  Labs 1/13/20 Gluc 131, A1c 6.8 Cr 0.60,  , HDL 64, Tg 181 TSH 3.210, FT4 1.40  Labs 6/20/20 Gluc 123, A1c 6.8% Cr 0.68, GFR 96 LDL 82, HDL 71, Tg 152 TSH 3.37, Ft4 1.66 urine alb/Cr 8  labs 12/30/20  Gluc 138, A1c 6.8 Cr 0.71, GFR 93 LDL 89, HDL 68, Tg 144 TSH 1.50, Ft4 1.56 CBC ok B12 554 vit D 33 urine alb/Cr 6  Labs 5/7/21 Gluc 127, A1c 6.8 Cr 0.64, GFR 97 LDL 74, HDL 81, Tg 96 TSH 6.80, ft4 1.13  labs 8/9/21 Gluc 173 Cr 0.58,  HDL 77, LDL 79, Tg 124 TSh 0.911, FT4 1.52 A1c 7.2  Labs 12/2021 Cr 0.5, A1c 6.8, LDL 86 Labs 3/28/22 TSH 0.830, FT4 1.59 Labs 7/7/22 TSH 6.09, FT4 1.24  Labs 10/5/22 Gluc 189, A1c 8.9 LDL 80, , Tg 87  Labs 2/26/23 Gluc 141, A1c 9.4 Cr 0.62,  LDL 76, HDL 91, Trig 125 TSH 2.41, FT4 1.62 urine alb/cr 8  Labs 5/2023  LDL 64, A1c 9.1  Labs 11/3/23 Gluc 130, A1c 8.5 Cr 0.56,  LDL 77, , Trig 119 TSH 2.85, FT4 1.31
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
## 2023-12-13 NOTE — REVIEW OF SYSTEMS
[Fatigue] : fatigue [Shortness Of Breath] : shortness of breath [Cough] : cough [SOB on Exertion] : shortness of breath on exertion [Pain/Numbness of Digits] : pain/numbness of digits [Polyuria] : polyuria [Recent Weight Loss (___ Lbs)] : recent weight loss: [unfilled] lbs [Blurred Vision] : no blurred vision [Chest Pain] : no chest pain [Nausea] : no nausea [Abdominal Pain] : no abdominal pain [Nocturia] : no nocturia [Polydipsia] : no polydipsia

## 2023-12-13 NOTE — PHYSICAL EXAM
[Alert] : alert [No Acute Distress] : no acute distress [EOMI] : extra ocular movement intact [No Accessory Muscle Use] : no accessory muscle use [Normal S1, S2] : normal S1 and S2 [Normal Rate] : heart rate was normal [No Edema] : no peripheral edema [Soft] : abdomen soft [Oriented x3] : oriented to person, place, and time [Normal Affect] : the affect was normal [Normal Insight/Judgement] : insight and judgment were intact [Normal Mood] : the mood was normal [de-identified] : on oxygen, decreased BS bilateral [de-identified] : cushingoid appearance from chronic steroids

## 2024-01-09 ENCOUNTER — APPOINTMENT (OUTPATIENT)
Dept: FAMILY MEDICINE | Facility: CLINIC | Age: 64
End: 2024-01-09
Payer: COMMERCIAL

## 2024-01-09 VITALS
BODY MASS INDEX: 24.75 KG/M2 | SYSTOLIC BLOOD PRESSURE: 128 MMHG | RESPIRATION RATE: 16 BRPM | OXYGEN SATURATION: 86 % | WEIGHT: 154 LBS | HEART RATE: 101 BPM | HEIGHT: 66 IN | DIASTOLIC BLOOD PRESSURE: 76 MMHG | TEMPERATURE: 97.5 F

## 2024-01-09 DIAGNOSIS — Z09 ENCOUNTER FOR FOLLOW-UP EXAMINATION AFTER COMPLETED TREATMENT FOR CONDITIONS OTHER THAN MALIGNANT NEOPLASM: ICD-10-CM

## 2024-01-09 DIAGNOSIS — Z23 ENCOUNTER FOR IMMUNIZATION: ICD-10-CM

## 2024-01-09 PROCEDURE — 90686 IIV4 VACC NO PRSV 0.5 ML IM: CPT

## 2024-01-09 PROCEDURE — 99496 TRANSJ CARE MGMT HIGH F2F 7D: CPT | Mod: 25

## 2024-01-09 PROCEDURE — G0008: CPT

## 2024-01-09 RX ORDER — AMLODIPINE BESYLATE 5 MG/1
5 TABLET ORAL DAILY
Qty: 90 | Refills: 0 | Status: DISCONTINUED | COMMUNITY
End: 2024-01-09

## 2024-01-16 PROBLEM — Z09 HOSPITAL DISCHARGE FOLLOW-UP: Status: ACTIVE | Noted: 2023-01-12

## 2024-01-16 PROBLEM — Z23 NEEDS FLU SHOT: Status: ACTIVE | Noted: 2022-10-12

## 2024-01-16 NOTE — HISTORY OF PRESENT ILLNESS
[Discharge Summary] : discharge summary [Discharge Med List] : discharge medication list [Patient Contacted By: ____] : and contacted by [unfilled] [Admitted on: ___] : The patient was admitted on [unfilled] [Discharged on ___] : discharged on [unfilled] [FreeTextEntry2] : Patient discharged from hospital after COPD exacerbation and pneumonia admission. She is on 3L supplemental oxygen and 02 at 86%, this is her baseline. She denies changes to her home medications, has finished all antibiotics prescribed. Is feeling great.

## 2024-01-16 NOTE — HEALTH RISK ASSESSMENT
[0] : 2) Feeling down, depressed, or hopeless: Not at all (0) [PHQ-2 Negative - No further assessment needed] : PHQ-2 Negative - No further assessment needed [DSN2Tvmna] : 0

## 2024-01-16 NOTE — PLAN
[FreeTextEntry1] : Flu vaccine- consent obtained, administered to left delt by NP without incident.  Continue current medications, Follow up with  pulm.  HTN- bp 128/74, well controlled with losartan 100mg. Medications reviewed and renewed.

## 2024-03-06 ENCOUNTER — APPOINTMENT (OUTPATIENT)
Age: 64
End: 2024-03-06
Payer: COMMERCIAL

## 2024-03-06 VITALS
HEART RATE: 93 BPM | DIASTOLIC BLOOD PRESSURE: 60 MMHG | OXYGEN SATURATION: 86 % | HEIGHT: 66 IN | BODY MASS INDEX: 27.32 KG/M2 | SYSTOLIC BLOOD PRESSURE: 120 MMHG | WEIGHT: 170 LBS | RESPIRATION RATE: 16 BRPM | TEMPERATURE: 97.6 F

## 2024-03-06 DIAGNOSIS — J02.9 ACUTE PHARYNGITIS, UNSPECIFIED: ICD-10-CM

## 2024-03-06 DIAGNOSIS — L03.90 CELLULITIS, UNSPECIFIED: ICD-10-CM

## 2024-03-06 PROCEDURE — 99214 OFFICE O/P EST MOD 30 MIN: CPT

## 2024-03-07 PROBLEM — L03.90 CELLULITIS: Status: ACTIVE | Noted: 2024-03-06

## 2024-03-07 NOTE — HISTORY OF PRESENT ILLNESS
[FreeTextEntry8] : Patient returns to office after worsening cough and sore throat.  She was admitted to the hospital 2/28 through 3/1.  She did not have pneumonia she was admitted for COPD exacerbation.  She now has a productive cough with thick yellow sputum and throat pain.  She denies chest pain shortness of breath or other complaint at this time.  She continues to use O2 at 2 L.

## 2024-03-07 NOTE — REVIEW OF SYSTEMS
[Sore Throat] : sore throat [Wheezing] : no wheezing [Shortness Of Breath] : no shortness of breath [Cough] : cough [Negative] : Heme/Lymph

## 2024-03-07 NOTE — PHYSICAL EXAM
[Normal Rate] : the respiratory rate was normal [Decreased Breath Sounds Bilaterally Bases] : breath sounds were diminished over both bases [Normal Rhythm/Effort] : normal respiratory rhythm and effort [Normal] : no carotid or abdominal bruits heard, no varicosities, pedal pulses are present, no peripheral edema, no extremity clubbing or cyanosis and no palpable aorta

## 2024-03-07 NOTE — PLAN
[FreeTextEntry1] : COPD exacerbation-x-ray ordered to rule out pneumonia.  Patient started on Levaquin 750.  This will also cover the possible cellulitis on her right breast related to her lymphedema. Sore throat-throat culture ordered. If symptoms worsen go to ER for reevaluation.  Patient verbalized understanding.

## 2024-03-08 LAB — BACTERIA THROAT CULT: NORMAL

## 2024-03-13 LAB
HBA1C MFR BLD HPLC: 8.5
LDLC SERPL DIRECT ASSAY-MCNC: 77

## 2024-03-13 RX ORDER — HUMAN INSULIN 100 [IU]/ML
100 INJECTION, SUSPENSION SUBCUTANEOUS
Qty: 1 | Refills: 1 | Status: ACTIVE | COMMUNITY
Start: 2023-12-13 | End: 1900-01-01

## 2024-03-13 RX ORDER — INSULIN ASPART 100 [IU]/ML
100 INJECTION, SOLUTION INTRAVENOUS; SUBCUTANEOUS DAILY
Qty: 2 | Refills: 0 | Status: ACTIVE | COMMUNITY
Start: 2023-08-30 | End: 1900-01-01

## 2024-03-13 RX ORDER — LEVOTHYROXINE SODIUM 0.12 MG/1
125 TABLET ORAL
Qty: 90 | Refills: 1 | Status: ACTIVE | COMMUNITY
Start: 1900-01-01 | End: 1900-01-01

## 2024-03-14 ENCOUNTER — APPOINTMENT (OUTPATIENT)
Dept: ENDOCRINOLOGY | Facility: CLINIC | Age: 64
End: 2024-03-14

## 2024-03-14 ENCOUNTER — APPOINTMENT (OUTPATIENT)
Dept: ENDOCRINOLOGY | Facility: CLINIC | Age: 64
End: 2024-03-14
Payer: COMMERCIAL

## 2024-03-14 ENCOUNTER — APPOINTMENT (OUTPATIENT)
Dept: FAMILY MEDICINE | Facility: CLINIC | Age: 64
End: 2024-03-14
Payer: COMMERCIAL

## 2024-03-14 VITALS
RESPIRATION RATE: 16 BRPM | SYSTOLIC BLOOD PRESSURE: 120 MMHG | WEIGHT: 170 LBS | HEART RATE: 109 BPM | TEMPERATURE: 97.2 F | OXYGEN SATURATION: 98 % | DIASTOLIC BLOOD PRESSURE: 60 MMHG | BODY MASS INDEX: 27.32 KG/M2 | HEIGHT: 66 IN

## 2024-03-14 DIAGNOSIS — J44.9 CHRONIC OBSTRUCTIVE PULMONARY DISEASE, UNSPECIFIED: ICD-10-CM

## 2024-03-14 DIAGNOSIS — R00.0 TACHYCARDIA, UNSPECIFIED: ICD-10-CM

## 2024-03-14 DIAGNOSIS — K44.9 DIAPHRAGMATIC HERNIA W/OUT OBSTRUCTION OR GANGRENE: ICD-10-CM

## 2024-03-14 DIAGNOSIS — G54.2 CERVICAL ROOT DISORDERS, NOT ELSEWHERE CLASSIFIED: ICD-10-CM

## 2024-03-14 DIAGNOSIS — I10 ESSENTIAL (PRIMARY) HYPERTENSION: ICD-10-CM

## 2024-03-14 DIAGNOSIS — J10.1 INFLUENZA DUE TO OTHER IDENTIFIED INFLUENZA VIRUS WITH OTHER RESPIRATORY MANIFESTATIONS: ICD-10-CM

## 2024-03-14 PROCEDURE — 99214 OFFICE O/P EST MOD 30 MIN: CPT

## 2024-03-14 PROCEDURE — 99496 TRANSJ CARE MGMT HIGH F2F 7D: CPT

## 2024-03-14 RX ORDER — ALPRAZOLAM 0.5 MG/1
0.5 TABLET ORAL
Qty: 60 | Refills: 0 | Status: ACTIVE | COMMUNITY
Start: 2024-03-14 | End: 1900-01-01

## 2024-03-14 RX ORDER — ANASTROZOLE TABLETS 1 MG/1
1 TABLET ORAL
Refills: 0 | Status: DISCONTINUED | COMMUNITY
End: 2024-03-14

## 2024-03-14 RX ORDER — ALENDRONATE SODIUM 70 MG/1
70 TABLET ORAL
Qty: 13 | Refills: 0 | Status: DISCONTINUED | COMMUNITY
End: 2024-03-14

## 2024-03-14 RX ORDER — OXYCODONE 5 MG/1
5 TABLET ORAL
Qty: 30 | Refills: 0 | Status: ACTIVE | COMMUNITY
Start: 2024-03-14 | End: 1900-01-01

## 2024-03-14 NOTE — PHYSICAL EXAM
[Alert] : alert [EOMI] : extra ocular movement intact [Oriented x3] : oriented to person, place, and time [Normal Affect] : the affect was normal [Normal Insight/Judgement] : insight and judgment were intact [Normal Mood] : the mood was normal

## 2024-03-14 NOTE — PHYSICAL EXAM
[No Acute Distress] : no acute distress [Well Nourished] : well nourished [Well Developed] : well developed [Well-Appearing] : well-appearing [Normal Sclera/Conjunctiva] : normal sclera/conjunctiva [EOMI] : extraocular movements intact [PERRL] : pupils equal round and reactive to light [Normal Outer Ear/Nose] : the outer ears and nose were normal in appearance [Normal Oropharynx] : the oropharynx was normal [No Lymphadenopathy] : no lymphadenopathy [Supple] : supple [No JVD] : no jugular venous distention [Thyroid Normal, No Nodules] : the thyroid was normal and there were no nodules present [No Respiratory Distress] : no respiratory distress  [No Accessory Muscle Use] : no accessory muscle use [Clear to Auscultation] : lungs were clear to auscultation bilaterally [Normal S1, S2] : normal S1 and S2 [Normal Rate] : normal rate  [Regular Rhythm] : with a regular rhythm [No Murmur] : no murmur heard [No Carotid Bruits] : no carotid bruits [No Varicosities] : no varicosities [No Abdominal Bruit] : a ~M bruit was not heard ~T in the abdomen [Pedal Pulses Present] : the pedal pulses are present [No Edema] : there was no peripheral edema [No Palpable Aorta] : no palpable aorta [No Extremity Clubbing/Cyanosis] : no extremity clubbing/cyanosis [Soft] : abdomen soft [Non Tender] : non-tender [Non-distended] : non-distended [No HSM] : no HSM [Normal Bowel Sounds] : normal bowel sounds [No Masses] : no abdominal mass palpated [Normal Posterior Cervical Nodes] : no posterior cervical lymphadenopathy [Normal Anterior Cervical Nodes] : no anterior cervical lymphadenopathy [No CVA Tenderness] : no CVA  tenderness [No Spinal Tenderness] : no spinal tenderness [No Joint Swelling] : no joint swelling [Grossly Normal Strength/Tone] : grossly normal strength/tone [Coordination Grossly Intact] : coordination grossly intact [No Rash] : no rash [Normal Gait] : normal gait [No Focal Deficits] : no focal deficits [Deep Tendon Reflexes (DTR)] : deep tendon reflexes were 2+ and symmetric [Normal Affect] : the affect was normal [Normal Insight/Judgement] : insight and judgment were intact
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
## 2024-03-14 NOTE — HEALTH RISK ASSESSMENT
[0] : 2) Feeling down, depressed, or hopeless: Not at all (0) [PHQ-2 Negative - No further assessment needed] : PHQ-2 Negative - No further assessment needed [[id number]] : 0

## 2024-03-14 NOTE — REVIEW OF SYSTEMS
[Recent Weight Loss (___ Lbs)] : recent weight loss: [unfilled] lbs [Fatigue] : fatigue [Cough] : cough [Shortness Of Breath] : shortness of breath [SOB on Exertion] : shortness of breath on exertion [Polyuria] : polyuria [Pain/Numbness of Digits] : pain/numbness of digits [Blurred Vision] : no blurred vision [Chest Pain] : no chest pain [Nausea] : no nausea [Nocturia] : no nocturia [Abdominal Pain] : no abdominal pain [Polydipsia] : no polydipsia

## 2024-03-14 NOTE — PLAN
[FreeTextEntry1] : 64-year-old female presents status post hospitalization discharged on March 11 from [facility] Acute influenza A-treated with Tamiflu history of COPD with exacerbation on steroids and neb treatments Lumbar disc disease-chronic low back pain a short course of oxycodone 5 mg 3 times daily as needed for pain as prescribed Anxiety syndrome-marked anxiety to be treated with alprazolam 0.5 mg twice daily as needed Sinus tachyarrhythmia-sinus tachycardia rate 10 Losartan is temporarily DC'd and metoprolol tartrate increased to twice daily
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
## 2024-03-14 NOTE — HISTORY OF PRESENT ILLNESS
[FreeTextEntry8] : Status post hospitalization [facility] discharge 4 days ago Acute influenza A with hypertension and tachycardia Complaining of low back pain and anxiety Notified by Nikolay

## 2024-03-14 NOTE — REASON FOR VISIT
[Follow - Up] : a follow-up visit [DM Type 2] : DM Type 2 [Hypothyroidism] : hypothyroidism [Thyroid nodule/ MNG] : thyroid nodule/ MNG [Other___] : [unfilled] [Home] : at home, [unfilled] , at the time of the visit. [Medical Office: ([facility])___] : at the medical office located in  [Spouse] : spouse [Patient] : the patient

## 2024-04-11 RX ORDER — PEN NEEDLE, DIABETIC 32GX 5/32"
32G X 4 MM NEEDLE, DISPOSABLE MISCELLANEOUS
Qty: 300 | Refills: 1 | Status: ACTIVE | COMMUNITY
Start: 2023-08-26 | End: 1900-01-01

## 2024-04-11 RX ORDER — DULAGLUTIDE 4.5 MG/.5ML
4.5 INJECTION, SOLUTION SUBCUTANEOUS
Qty: 3 | Refills: 1 | Status: ACTIVE | COMMUNITY
Start: 2022-04-15 | End: 1900-01-01

## 2024-05-07 ENCOUNTER — APPOINTMENT (OUTPATIENT)
Dept: FAMILY MEDICINE | Facility: CLINIC | Age: 64
End: 2024-05-07

## 2024-05-14 ENCOUNTER — APPOINTMENT (OUTPATIENT)
Dept: ENDOCRINOLOGY | Facility: CLINIC | Age: 64
End: 2024-05-14
Payer: COMMERCIAL

## 2024-05-14 DIAGNOSIS — E11.65 TYPE 2 DIABETES MELLITUS WITH HYPERGLYCEMIA: ICD-10-CM

## 2024-05-14 DIAGNOSIS — E03.8 OTHER SPECIFIED HYPOTHYROIDISM: ICD-10-CM

## 2024-05-14 DIAGNOSIS — E78.5 HYPERLIPIDEMIA, UNSPECIFIED: ICD-10-CM

## 2024-05-14 DIAGNOSIS — E11.59 TYPE 2 DIABETES MELLITUS WITH OTHER CIRCULATORY COMPLICATIONS: ICD-10-CM

## 2024-05-14 DIAGNOSIS — M81.0 AGE-RELATED OSTEOPOROSIS W/OUT CURRENT PATHOLOGICAL FRACTURE: ICD-10-CM

## 2024-05-14 PROCEDURE — 99215 OFFICE O/P EST HI 40 MIN: CPT

## 2024-05-14 RX ORDER — OMEPRAZOLE 20 MG/1
20 TABLET, DELAYED RELEASE ORAL
Qty: 90 | Refills: 1 | Status: DISCONTINUED | COMMUNITY
Start: 2022-10-12 | End: 2024-05-14

## 2024-05-14 NOTE — REASON FOR VISIT
[Follow - Up] : a follow-up visit [DM Type 2] : DM Type 2 [Hypothyroidism] : hypothyroidism [Thyroid nodule/ MNG] : thyroid nodule/ MNG [Other___] : [unfilled] [Home] : at home, [unfilled] , at the time of the visit. [Medical Office: ([facility])___] : at the medical office located in  [Spouse] : spouse [Patient] : the patient
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
## 2024-05-14 NOTE — PHYSICAL EXAM
[Alert] : alert [No Acute Distress] : no acute distress [EOMI] : extra ocular movement intact [de-identified] : tearful at visit
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
## 2024-05-16 ENCOUNTER — APPOINTMENT (OUTPATIENT)
Dept: FAMILY MEDICINE | Facility: CLINIC | Age: 64
End: 2024-05-16
Payer: COMMERCIAL

## 2024-05-16 VITALS
WEIGHT: 170 LBS | TEMPERATURE: 97 F | HEART RATE: 97 BPM | OXYGEN SATURATION: 96 % | SYSTOLIC BLOOD PRESSURE: 120 MMHG | HEIGHT: 66 IN | BODY MASS INDEX: 27.32 KG/M2 | RESPIRATION RATE: 16 BRPM | DIASTOLIC BLOOD PRESSURE: 80 MMHG

## 2024-05-16 DIAGNOSIS — R29.6 REPEATED FALLS: ICD-10-CM

## 2024-05-16 DIAGNOSIS — R22.40 LOCALIZED SWELLING, MASS AND LUMP, UNSPECIFIED LOWER LIMB: ICD-10-CM

## 2024-05-16 DIAGNOSIS — J18.9 PNEUMONIA, UNSPECIFIED ORGANISM: ICD-10-CM

## 2024-05-16 PROCEDURE — 99496 TRANSJ CARE MGMT HIGH F2F 7D: CPT

## 2024-05-16 RX ORDER — LOSARTAN POTASSIUM 25 MG/1
25 TABLET, FILM COATED ORAL
Qty: 90 | Refills: 0 | Status: ACTIVE | COMMUNITY
Start: 2022-01-10 | End: 1900-01-01

## 2024-05-16 RX ORDER — METOPROLOL TARTRATE 50 MG/1
50 TABLET, FILM COATED ORAL
Refills: 0 | Status: ACTIVE | COMMUNITY
Start: 2024-05-16

## 2024-05-16 RX ORDER — GLIPIZIDE 5 MG/1
5 TABLET, FILM COATED, EXTENDED RELEASE ORAL DAILY
Qty: 90 | Refills: 1 | Status: DISCONTINUED | COMMUNITY
End: 2024-05-16

## 2024-05-16 RX ORDER — LEVOFLOXACIN 750 MG/1
750 TABLET, FILM COATED ORAL
Qty: 7 | Refills: 0 | Status: DISCONTINUED | COMMUNITY
Start: 2024-03-06 | End: 2024-05-16

## 2024-05-16 RX ORDER — PRAVASTATIN SODIUM 40 MG/1
40 TABLET ORAL
Qty: 90 | Refills: 1 | Status: ACTIVE | COMMUNITY
Start: 2021-06-16 | End: 1900-01-01

## 2024-05-16 RX ORDER — ALENDRONATE SODIUM 70 MG/1
70 TABLET ORAL
Qty: 12 | Refills: 1 | Status: DISCONTINUED | COMMUNITY
Start: 2023-03-30 | End: 2024-05-16

## 2024-05-16 RX ORDER — CHLORHEXIDINE GLUCONATE 4 %
325 (65 FE) LIQUID (ML) TOPICAL
Qty: 90 | Refills: 1 | Status: ACTIVE | COMMUNITY
Start: 2023-08-10 | End: 1900-01-01

## 2024-05-16 RX ORDER — FLUTICASONE FUROATE AND VILANTEROL TRIFENATATE 100; 25 UG/1; UG/1
100-25 POWDER RESPIRATORY (INHALATION)
Refills: 0 | Status: ACTIVE | COMMUNITY
Start: 2024-05-16

## 2024-05-16 RX ORDER — OXYCODONE AND ACETAMINOPHEN 5; 325 MG/1; MG/1
5-325 TABLET ORAL EVERY 8 HOURS
Qty: 30 | Refills: 0 | Status: ACTIVE | COMMUNITY
Start: 2024-05-16 | End: 1900-01-01

## 2024-05-16 RX ORDER — BUDESONIDE AND FORMOTEROL FUMARATE DIHYDRATE 160; 4.5 UG/1; UG/1
AEROSOL RESPIRATORY (INHALATION)
Refills: 0 | Status: DISCONTINUED | COMMUNITY
End: 2024-05-16

## 2024-05-16 NOTE — PHYSICAL EXAM
[No Respiratory Distress] : no respiratory distress  [Normal Rate] : the respiratory rate was normal [Decreased Breath Sounds Bilaterally Midlung Fields] : breath sounds were diminished over both midlung fields [Decreased Breath Sounds Bilaterally Bases] : breath sounds were diminished over both bases [Normal] : affect was normal and insight and judgment were intact

## 2024-05-16 NOTE — PLAN
[FreeTextEntry1] : Pneumonia-patient will finish prescribed antibiotics. Sternal fracture-prescribed short-term oxycodone 5 mg.  Educated if she needs continued pain medication she has to follow-up with pain management. Palpable mass to left upper thigh.  Ultrasound ordered. Unsteady gait/frequent falls.  Referral for physical therapy to evaluate and treat.
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
## 2024-07-16 LAB
HBA1C MFR BLD HPLC: 7.6
LDLC SERPL DIRECT ASSAY-MCNC: 70
MICROALBUMIN/CREAT 24H UR-RTO: 17
TSH SERPL-ACNC: 0.59

## 2024-07-17 ENCOUNTER — APPOINTMENT (OUTPATIENT)
Dept: ENDOCRINOLOGY | Facility: CLINIC | Age: 64
End: 2024-07-17

## 2024-08-03 ENCOUNTER — APPOINTMENT (OUTPATIENT)
Dept: ENDOCRINOLOGY | Facility: CLINIC | Age: 64
End: 2024-08-03